# Patient Record
Sex: FEMALE | Race: WHITE | NOT HISPANIC OR LATINO | Employment: FULL TIME | URBAN - METROPOLITAN AREA
[De-identification: names, ages, dates, MRNs, and addresses within clinical notes are randomized per-mention and may not be internally consistent; named-entity substitution may affect disease eponyms.]

---

## 2017-01-31 DIAGNOSIS — E03.9 HYPOTHYROIDISM, UNSPECIFIED TYPE: ICD-10-CM

## 2017-01-31 RX ORDER — LEVOTHYROXINE SODIUM 0.1 MG/1
100 TABLET ORAL DAILY
Qty: 30 TAB | Refills: 3 | Status: SHIPPED | OUTPATIENT
Start: 2017-01-31 | End: 2017-07-05 | Stop reason: SDUPTHER

## 2017-02-28 ENCOUNTER — OFFICE VISIT (OUTPATIENT)
Dept: MEDICAL GROUP | Facility: MEDICAL CENTER | Age: 57
End: 2017-02-28
Payer: COMMERCIAL

## 2017-02-28 VITALS
DIASTOLIC BLOOD PRESSURE: 60 MMHG | SYSTOLIC BLOOD PRESSURE: 108 MMHG | HEART RATE: 60 BPM | HEIGHT: 71 IN | RESPIRATION RATE: 14 BRPM | TEMPERATURE: 98.2 F | OXYGEN SATURATION: 98 % | WEIGHT: 142.42 LBS | BODY MASS INDEX: 19.94 KG/M2

## 2017-02-28 DIAGNOSIS — L98.9 BENIGN SKIN LESION: ICD-10-CM

## 2017-02-28 DIAGNOSIS — R59.1 LYMPHADENOPATHY: ICD-10-CM

## 2017-02-28 PROCEDURE — 99214 OFFICE O/P EST MOD 30 MIN: CPT | Performed by: PHYSICIAN ASSISTANT

## 2017-02-28 RX ORDER — CLINDAMYCIN PHOSPHATE 10 MG/G
1 AEROSOL, FOAM TOPICAL 2 TIMES DAILY PRN
Qty: 50 G | Refills: 2 | Status: SHIPPED | OUTPATIENT
Start: 2017-02-28 | End: 2019-07-14 | Stop reason: SDUPTHER

## 2017-02-28 RX ORDER — CLOBETASOL PROPIONATE 0.5 MG/G
1 CREAM TOPICAL 2 TIMES DAILY
Qty: 45 G | Refills: 0 | Status: SHIPPED | OUTPATIENT
Start: 2017-02-28

## 2017-02-28 ASSESSMENT — PAIN SCALES - GENERAL: PAINLEVEL: NO PAIN

## 2017-02-28 ASSESSMENT — PATIENT HEALTH QUESTIONNAIRE - PHQ9: CLINICAL INTERPRETATION OF PHQ2 SCORE: 0

## 2017-02-28 NOTE — ASSESSMENT & PLAN NOTE
Was in [facility] on a mission trip.   Toward the end she developed congestion, ST, coughing and her cervical and axillary lymph nodes were elevated.   Since then her sx have resolved except her right neck lymph node which is still going down.   Denies fever, headache, chills, sore throat, nasal congestion, cough, shortness of breath, chest pain, abdominal discomfort, nausea, vomiting, weight loss.

## 2017-02-28 NOTE — PROGRESS NOTES
Subjective:     Chief Complaint   Patient presents with   • Medication Refill     skin breaking out     Lianne Abreu is a 56 y.o. female here today for lymph node in skin lesions as listed below    Lymphadenopathy  Was in [facility] on a mission trip.   Toward the end she developed congestion, ST, coughing and her cervical and axillary lymph nodes were elevated.   Since then her sx have resolved except her right neck lymph node which is still going down.   Denies fever, headache, chills, sore throat, nasal congestion, cough, shortness of breath, chest pain, abdominal discomfort, nausea, vomiting, weight loss.     Benign skin lesion  Has history of skin irritation symptoms and she would pick and gouge them out. Stated this is partially very sensitive skin and had some psychiatric issues at the time.  Has not done that in several years.  Although she does get skin eruptions when she is stressed.   She does see dermatologist Dr. Nugent.   Has been given clindamycin 1% foam 2 use during flares.   Although she states it does not help very much.   She does request not to scratch or touch although she will fall asleep itching/stratching or she will wake up itching/stratching.   Has noticed her warm baths which help soothe her muscles ache her skin worse.   She will put Neosporin on them.   Usually flares on her buttock region and left shoulder which are present today.   Denies fever, chills, warmth, spreading.        Current medicines (including changes today)  Current Outpatient Prescriptions   Medication Sig Dispense Refill   • Clindamycin Phosphate 1 % Foam 1 g by Apply externally route 2 times a day as needed. 50 g 2   • clobetasol (TEMOVATE) 0.05 % Cream Apply 1 g to affected area(s) 2 times a day. Apply to affected skin BID for up to 2 weeks. DO NOT use on FACE 45 g 0   • levothyroxine (SYNTHROID) 100 MCG Tab Take 1 Tab by mouth every day. In morning one hour prior to any food or drink. 30 Tab 3   • estradiol  "(MINIVELLE) 0.0375 MG/24HR patch 1 path BIW 12 Tab 3   • metaxalone (SKELAXIN) 800 MG Tab Take 1 Tab by mouth 3 times a day. 90 Tab 0   • ibuprofen (MOTRIN) 600 MG Tab Take 1 Tab by mouth every 6 hours as needed. 90 Tab 1   • Cholecalciferol (VITAMIN D) 2000 UNITS CAPS Take  by mouth every day.     • CALCIUM-MAGNESIUM-ZINC PO Take  by mouth every day.       No current facility-administered medications for this visit.     She  has a past medical history of Other specified symptom associated with female genital organs; Unspecified disorder of thyroid; Anesthesia; Unspecified hemorrhagic conditions (CMS-[facility]) (01/2015); and Raynaud's syndrome.    ROS   No chest pain, no shortness of breath, no abdominal pain       Objective:     Blood pressure 108/60, pulse 60, temperature 36.8 °C (98.2 °F), resp. rate 14, height 1.803 m (5' 10.98\"), weight 64.6 kg (142 lb 6.7 oz), SpO2 98 %, not currently breastfeeding. Body mass index is 19.87 kg/(m^2).   Physical Exam:  Alert, oriented in no acute distress.  Eye contact is good, speech goal directed, affect calm  HEENT: conjunctiva non-injected, sclera non-icteric, PERRL.  Pinna normal. TM pearly gray.   Oral mucous membranes pink and moist with no lesions. Dentition intact  Neck + right cervical lymphadenopathy, smooth firm mobile, no other cervical lymph nodes noted, no other masses in the neck, supraclavicular regions or axillary regions.  Lungs: clear to auscultation bilaterally with good excursion.   CV: regular rate and rhythm.  Abdomen: soft, nontender, no HSM, No CVAT  Skin: Arms bilaterally with several well-healed time size to quarter size circular scarring.   Left shoulder with approximately 5 mm circular abrasion, no erythema, edema, drainage, warmth.  Botox and laterally although right greater than left with multiple 4-6 mm circular abrasions, no vesicles, pustules, drainage, edema, erythema, nodules, warmth.   Ext: no edema, color normal, peripheral pulses 2+, " temperature normal      Assessment and Plan:   The following treatment plan was discussed     1. Benign skin lesion  Possibly 's nodules.   Discussed the clindamycin is a good parent to continue to help with infection control since they're open wounds.   Although recommend when the first erupt possibly use a topical steroid cream. This may help with itch and help them resolve faster.   Do ot use on face, do not use on open wound.   Only use for 2 weeks then 1 week break.   Recommend using cold compresses, gentle dove soap only in axillae, groin and feet and applying good creams after shower to help with itch  - Clindamycin Phosphate 1 % Foam; 1 g by Apply externally route 2 times a day as needed.  Dispense: 50 g; Refill: 2  - clobetasol (TEMOVATE) 0.05 % Cream; Apply 1 g to affected area(s) 2 times a day. Apply to affected skin BID for up to 2 weeks. DO NOT use on FACE  Dispense: 45 g; Refill: 0    2. Lymphadenopathy  New dx, resolving per pt.   Told to continue watching and if it persists we will need to US. Pt okay with that plan      Followup: Return if symptoms worsen or fail to improve.           Please note that this dictation was created using voice recognition software. I have made every reasonable attempt to correct obvious errors, but I expect that there are errors of grammar and possibly content that I did not discover before finalizing the note.

## 2017-02-28 NOTE — MR AVS SNAPSHOT
"Lianne Macdonaldn   2017 7:00 AM   Office Visit   MRN: [id number]    Department:  [place] [address]   Dept Phone:  [phone]    Description:  Female : 1960   Provider:  Irma Lundberg PA-C           Reason for Visit     Medication Refill skin breaking out      Allergies as of 2017     No Known Allergies      You were diagnosed with     Benign skin lesion   [850508]         Vital Signs     Blood Pressure Pulse Temperature Respirations Height Weight    108/60 mmHg 60 36.8 °C (98.2 °F) 14 1.803 m (5' 10.98\") 64.6 kg (142 lb 6.7 oz)    Body Mass Index Oxygen Saturation Breastfeeding? Smoking Status          19.87 kg/m2 98% No Never Smoker         Basic Information     Date Of Birth Sex Race Ethnicity Preferred Language    1960 Female White Non- English      Your appointments     May 19, 2017  8:20 AM   ANNUAL EXAM PREVENTATIVE with Irma Lundberg PA-C   [facility] ([place])    [address]  [address]  [place] [address] [phone]              Problem List              ICD-10-CM Priority Class Noted - Resolved    S/P hysterectomy Z90.710   2015 - Present    Hypothyroidism E03.9   3/18/2016 - Present    Preventative health care Z00.00   3/18/2016 - Present    Family history of nonmelanoma skin cancer Z80.8   3/18/2016 - Present    Acute bilateral low back pain without sciatica M54.5   2016 - Present      Health Maintenance        Date Due Completion Dates    PAP SMEAR 1982 ---    COLONOSCOPY 2011 ---    MAMMOGRAM 3/27/2016 3/27/2015, 3/20/2015, 3/20/2015, 3/20/2015, 2005    IMM INFLUENZA (1) 2016    IMM DTaP/Tdap/Td Vaccine (2 - Td) 2024            Current Immunizations     Hepatitis A Vaccine, Adult 2004, 2004    Hepatitis B Vaccine Recombivax (Adol/Adult) 2004, 2004    Influenza Vaccine Quad Inj (Pf) 2011    Meningococcal Conjugate Vaccine MCV4 (Menveo) " 1/22/2016    Tdap Vaccine 8/8/2014      Below and/or attached are the medications your provider expects you to take. Review all of your home medications and newly ordered medications with your provider and/or pharmacist. Follow medication instructions as directed by your provider and/or pharmacist. Please keep your medication list with you and share with your provider. Update the information when medications are discontinued, doses are changed, or new medications (including over-the-counter products) are added; and carry medication information at all times in the event of emergency situations     Allergies:  No Known Allergies          Medications  Valid as of: February 28, 2017 -  7:49 AM    Generic Name Brand Name Tablet Size Instructions for use    Calcium-Magnesium-Zinc   Take  by mouth every day.        Cholecalciferol (Cap) Vitamin D 2000 UNITS Take  by mouth every day.        Clindamycin Phosphate (Foam) Clindamycin Phosphate 1 % 1 g by Apply externally route 2 times a day as needed.        Clobetasol Propionate (Cream) TEMOVATE 0.05 % Apply 1 g to affected area(s) 2 times a day. Apply to affected skin BID for up to 2 weeks. DO NOT use on FACE        Estradiol (PATCH BIWEEKLY) VIVELLE 0.0375 MG/24HR 1 path BIW        Ibuprofen (Tab) MOTRIN 600 MG Take 1 Tab by mouth every 6 hours as needed.        Levothyroxine Sodium (Tab) SYNTHROID 100 MCG Take 1 Tab by mouth every day. In morning one hour prior to any food or drink.        Metaxalone (Tab) SKELAXIN 800 MG Take 1 Tab by mouth 3 times a day.        .                 Medicines prescribed today were sent to:     [facility] [place], [place] - [address]    [address] [place] [address]    Phone: [phone] Fax: [phone]    Open 24 Hours?: No      Medication refill instructions:       If your prescription bottle indicates you have medication refills left, it is not necessary to call your provider’s office. Please contact your  pharmacy and they will refill your medication.    If your prescription bottle indicates you do not have any refills left, you may request refills at any time through one of the following ways: The online [facility] system (except Urgent Care), by calling your provider’s office, or by asking your pharmacy to contact your provider’s office with a refill request. Medication refills are processed only during regular business hours and may not be available until the next business day. Your provider may request additional information or to have a follow-up visit with you prior to refilling your medication.   *Please Note: Medication refills are assigned a new Rx number when refilled electronically. Your pharmacy may indicate that no refills were authorized even though a new prescription for the same medication is available at the pharmacy. Please request the medicine by name with the pharmacy before contacting your provider for a refill.           [facility] Access Code: Activation code not generated  Current Health Integrated Status: Active

## 2017-02-28 NOTE — ASSESSMENT & PLAN NOTE
Has history of skin irritation symptoms and she would pick and gouge them out. Stated this is partially very sensitive skin and had some psychiatric issues at the time.  Has not done that in several years.  Although she does get skin eruptions when she is stressed.   She does see dermatologist Dr. Nugent.   Has been given clindamycin 1% foam 2 use during flares.   Although she states it does not help very much.   She does request not to scratch or touch although she will fall asleep itching/stratching or she will wake up itching/stratching.   Has noticed her warm baths which help soothe her muscles ache her skin worse.   She will put Neosporin on them.   Usually flares on her buttock region and left shoulder which are present today.   Denies fever, chills, warmth, spreading.

## 2017-03-15 ENCOUNTER — PATIENT MESSAGE (OUTPATIENT)
Dept: MEDICAL GROUP | Facility: MEDICAL CENTER | Age: 57
End: 2017-03-15

## 2017-03-15 DIAGNOSIS — R59.1 LYMPHADENOPATHY: ICD-10-CM

## 2017-03-20 ENCOUNTER — PATIENT MESSAGE (OUTPATIENT)
Dept: MEDICAL GROUP | Facility: MEDICAL CENTER | Age: 57
End: 2017-03-20

## 2017-03-20 ENCOUNTER — TELEPHONE (OUTPATIENT)
Dept: MEDICAL GROUP | Facility: MEDICAL CENTER | Age: 57
End: 2017-03-20

## 2017-03-20 DIAGNOSIS — R59.1 LYMPHADENOPATHY: ICD-10-CM

## 2017-03-20 NOTE — TELEPHONE ENCOUNTER
1. Caller Name: Lianne Abreu                                           Call Back Number: [phone] (home)         Patient approves a detailed voicemail message: yes    2. What are the patient's symptoms (location & severity)? Swollen Lymph node on neck    3. Is this a new symptom Yes    4. When did it start? N/A    5. Action taken per Active Symptom Guide: Pt talked to [facility] needs Provider orders for US     6. Patient agrees to recommended action per active symptom guide     Pt call in today and asked If you could put in and Stat order for a Ultrasound of swollen lymph nodes in neck area.     Has to be placed as Stat/Urgent so that [facility] will Cover Pt is in [facility] for the next 2 weeks.    [facility]   Phone: [phone]  Fax: [phone]

## 2017-03-21 ENCOUNTER — TELEPHONE (OUTPATIENT)
Dept: MEDICAL GROUP | Facility: MEDICAL CENTER | Age: 57
End: 2017-03-21

## 2017-03-21 NOTE — TELEPHONE ENCOUNTER
Pt called stating that she has been waiting for a response about her PA for the US and if she hasn't heard anything back later today. She will just cancel and re-schedule her appointment with Irma Lundberg PA-C.

## 2017-03-21 NOTE — TELEPHONE ENCOUNTER
Called Pt to return call     Had to re submitted paper work due to [facility] changing fax number

## 2017-04-11 ENCOUNTER — APPOINTMENT (OUTPATIENT)
Dept: MEDICAL GROUP | Facility: MEDICAL CENTER | Age: 57
End: 2017-04-11
Payer: COMMERCIAL

## 2017-04-18 ENCOUNTER — OFFICE VISIT (OUTPATIENT)
Dept: MEDICAL GROUP | Facility: MEDICAL CENTER | Age: 57
End: 2017-04-18
Payer: COMMERCIAL

## 2017-04-18 VITALS
RESPIRATION RATE: 14 BRPM | HEART RATE: 63 BPM | BODY MASS INDEX: 20.61 KG/M2 | WEIGHT: 143.96 LBS | SYSTOLIC BLOOD PRESSURE: 106 MMHG | HEIGHT: 70 IN | DIASTOLIC BLOOD PRESSURE: 60 MMHG | OXYGEN SATURATION: 99 % | TEMPERATURE: 98.3 F

## 2017-04-18 DIAGNOSIS — R22.1 NECK MASS: ICD-10-CM

## 2017-04-18 PROCEDURE — 99214 OFFICE O/P EST MOD 30 MIN: CPT | Performed by: PHYSICIAN ASSISTANT

## 2017-04-18 ASSESSMENT — PAIN SCALES - GENERAL: PAINLEVEL: NO PAIN

## 2017-04-18 NOTE — ASSESSMENT & PLAN NOTE
Last OV 2/28/17 discussed a new neck mass  Was in [facility] on a mission trip.    Toward the end she developed congestion, ST, coughing and her cervical and axillary lymph nodes were elevated.    Since then her sx have resolved except her right neck mass which was still going down at that time.   Since then she went to [place] and noticed it enlarged again.  She did have a mild cold. Once her cold resolved it seemed to subside although is still larger than it was back in February.  While she was in [place] we ordered ultrasound neck  3/24/17 ultrasound neck- 0.5 x 0.2 x 0.4 cm cyst in right thyroid lobe otherwise had normal nonenlarged scattered lymph nodes. No other masses noted   Has some nasal congestion, intermittent ear popping  Denies fever, headache, chills, sore throat, cough, dysphagia, acid taste in mouth, shortness of breath, chest pain, abdominal discomfort, nausea, vomiting, weight loss.

## 2017-04-18 NOTE — MR AVS SNAPSHOT
"Lianne Abreu   2017 7:00 AM   Office Visit   MRN: [id number]    Department:  [place] [address]   Dept Phone:  [phone]    Description:  Female : 1960   Provider:  Irma Lundberg PA-C           Reason for Visit     Follow-Up recheck glands in neck       Allergies as of 2017     No Known Allergies      You were diagnosed with     Neck mass   [884221]         Vital Signs     Blood Pressure Pulse Temperature Respirations Height Weight    106/60 mmHg 63 36.8 °C (98.3 °F) 14 1.778 m (5' 10\") 65.3 kg (143 lb 15.4 oz)    Body Mass Index Oxygen Saturation Breastfeeding? Smoking Status          20.66 kg/m2 99% No Never Smoker         Basic Information     Date Of Birth Sex Race Ethnicity Preferred Language    1960 Female White Non- English      Your appointments     May 11, 2017  4:30 PM   MA [id number] with S BRYANTAN MG 1   [facility] ([place]    [address]  [place] [id number] [phone]           No deodorant, powder, perfume or lotion under the arm or breast area.            May 19, 2017  8:20 AM   ANNUAL EXAM PREVENTATIVE with Irma Lundberg PA-C   [facility]    [address]  [address]  [place] [address] [phone]              Problem List              ICD-10-CM Priority Class Noted - Resolved    S/P hysterectomy Z90.710   2015 - Present    Hypothyroidism E03.9   3/18/2016 - Present    Preventative health care Z00.00   3/18/2016 - Present    Family history of nonmelanoma skin cancer Z80.8   3/18/2016 - Present    Acute bilateral low back pain without sciatica M54.5   2016 - Present    Lymphadenopathy R59.1   2017 - Present    Benign skin lesion L98.9   2017 - Present    Neck mass R22.1   2017 - Present      Health Maintenance        Date Due Completion Dates    PAP SMEAR 1982 ---    COLONOSCOPY 2011 ---    MAMMOGRAM " 3/27/2016 3/27/2015, 8/9/2005    IMM DTaP/Tdap/Td Vaccine (2 - Td) 8/8/2024 8/8/2014            Current Immunizations     Hepatitis A Vaccine, Adult 8/6/2004, 7/9/2004    Hepatitis B Vaccine Recombivax (Adol/Adult) 8/6/2004, 7/9/2004    Influenza Vaccine Quad Inj (Pf) 11/5/2011    Meningococcal Conjugate Vaccine MCV4 (Menveo) 1/22/2016    Tdap Vaccine 8/8/2014      Below and/or attached are the medications your provider expects you to take. Review all of your home medications and newly ordered medications with your provider and/or pharmacist. Follow medication instructions as directed by your provider and/or pharmacist. Please keep your medication list with you and share with your provider. Update the information when medications are discontinued, doses are changed, or new medications (including over-the-counter products) are added; and carry medication information at all times in the event of emergency situations     Allergies:  No Known Allergies          Medications  Valid as of: April 18, 2017 -  9:54 AM    Generic Name Brand Name Tablet Size Instructions for use    Calcium-Magnesium-Zinc   Take  by mouth every day.        Cholecalciferol (Cap) Vitamin D 2000 UNITS Take  by mouth every day.        Clindamycin Phosphate (Foam) Clindamycin Phosphate 1 % 1 g by Apply externally route 2 times a day as needed.        Clobetasol Propionate (Cream) TEMOVATE 0.05 % Apply 1 g to affected area(s) 2 times a day. Apply to affected skin BID for up to 2 weeks. DO NOT use on FACE        Estradiol (PATCH BIWEEKLY) VIVELLE 0.0375 MG/24HR 1 path BIW        Ibuprofen (Tab) MOTRIN 600 MG Take 1 Tab by mouth every 6 hours as needed.        Levothyroxine Sodium (Tab) SYNTHROID 100 MCG Take 1 Tab by mouth every day. In morning one hour prior to any food or drink.        Metaxalone (Tab) SKELAXIN 800 MG Take 1 Tab by mouth 3 times a day.        .                 Medicines prescribed today were sent to:     [id number] -  [place], [place] - [address]    [address] [place] [address]    Phone: [phone] Fax: [phone]    Open 24 Hours?: No      Medication refill instructions:       If your prescription bottle indicates you have medication refills left, it is not necessary to call your provider’s office. Please contact your pharmacy and they will refill your medication.    If your prescription bottle indicates you do not have any refills left, you may request refills at any time through one of the following ways: The online [employer] system (except Urgent Care), by calling your provider’s office, or by asking your pharmacy to contact your provider’s office with a refill request. Medication refills are processed only during regular business hours and may not be available until the next business day. Your provider may request additional information or to have a follow-up visit with you prior to refilling your medication.   *Please Note: Medication refills are assigned a new Rx number when refilled electronically. Your pharmacy may indicate that no refills were authorized even though a new prescription for the same medication is available at the pharmacy. Please request the medicine by name with the pharmacy before contacting your provider for a refill.        Your To Do List     Future Labs/Procedures Complete By Expires    BASIC METABOLIC PANEL  As directed 4/19/2018    MR-SOFT TISSUE NECK-WITH & W/O  As directed 4/18/2018         [employer] Access Code: Activation code not generated  Current [employer] Status: Active

## 2017-04-18 NOTE — PROGRESS NOTES
Subjective:     Chief Complaint   Patient presents with   • Follow-Up     recheck glands in neck      Lianne Abreu is a 56 y.o. female here today for neck mass as listed below    Neck mass  Last OV 2/28/17 discussed a new neck mass  Was in [facility] on a mission trip.    Toward the end she developed congestion, ST, coughing and her cervical and axillary lymph nodes were elevated.    Since then her sx have resolved except her right neck mass which was still going down at that time.   Since then she went to [place] and noticed it enlarged again.  She did have a mild cold. Once her cold resolved it seemed to subside although is still larger than it was back in February.  While she was in [place] we ordered ultrasound neck  3/24/17 ultrasound neck- 0.5 x 0.2 x 0.4 cm cyst in right thyroid lobe otherwise had normal nonenlarged scattered lymph nodes. No other masses noted   Has some nasal congestion, intermittent ear popping  Denies fever, headache, chills, sore throat, cough, dysphagia, acid taste in mouth, shortness of breath, chest pain, abdominal discomfort, nausea, vomiting, weight loss.          Current medicines (including changes today)  Current Outpatient Prescriptions   Medication Sig Dispense Refill   • Clindamycin Phosphate 1 % Foam 1 g by Apply externally route 2 times a day as needed. 50 g 2   • clobetasol (TEMOVATE) 0.05 % Cream Apply 1 g to affected area(s) 2 times a day. Apply to affected skin BID for up to 2 weeks. DO NOT use on FACE 45 g 0   • levothyroxine (SYNTHROID) 100 MCG Tab Take 1 Tab by mouth every day. In morning one hour prior to any food or drink. 30 Tab 3   • metaxalone (SKELAXIN) 800 MG Tab Take 1 Tab by mouth 3 times a day. 90 Tab 0   • ibuprofen (MOTRIN) 600 MG Tab Take 1 Tab by mouth every 6 hours as needed. 90 Tab 1   • estradiol (MINIVELLE) 0.0375 MG/24HR patch 1 path BIW 12 Tab 3   • Cholecalciferol (VITAMIN D) 2000 UNITS CAPS Take  by mouth every day.     • CALCIUM-MAGNESIUM-ZINC PO  "Take  by mouth every day.       No current facility-administered medications for this visit.     She  has a past medical history of Other specified symptom associated with female genital organs; Unspecified disorder of thyroid; Anesthesia; Unspecified hemorrhagic conditions (CMS-HCC) (01/2015); and Raynaud's syndrome.    ROS   See history of present illness above    Objective:     Blood pressure 106/60, pulse 63, temperature 36.8 °C (98.3 °F), resp. rate 14, height 1.778 m (5' 10\"), weight 65.3 kg (143 lb 15.4 oz), SpO2 99 %, not currently breastfeeding. Body mass index is 20.66 kg/(m^2).   Physical Exam:  Alert, oriented in no acute distress.  Eye contact is good, speech goal directed, affect calm  HEENT: conjunctiva non-injected, sclera non-icteric, PERRL.  Pinna normal. TM pearly gray.   Nares patent, nasal turbinates pink and moist  Oral mucous membranes pink and moist with no lesions. Mild to moderate postnasal drip which is clear  Neck positive right mid neck mass noted, approximately 1.5 cm firm, nonmobile, approximately 4 cm superior from thyroid, no erythema, edema, ecchymosis  Lungs: clear to auscultation bilaterally with good excursion.  CV: regular rate and rhythm.  Ext: no edema, color normal, peripheral pulses 2+, temperature normal        Assessment and Plan:   The following treatment plan was discussed     1. Neck mass  Persistent, seems like a neck mass is quite a ways from her thyroid, there was no mention of this on ultrasound.  Patient does have disc although she has at home she does not have it with her.  We will try to get that skidded into her chart so we can see the imaging.  Since mass still present plus non-mobile and very firm will get MRI.   - MR-SOFT TISSUE NECK-WITH & W/O; Future  - BASIC METABOLIC PANEL; Future      Followup: Return if symptoms worsen or fail to improve.           Please note that this dictation was created using voice recognition software. I have made every reasonable " attempt to correct obvious errors, but I expect that there are errors of grammar and possibly content that I did not discover before finalizing the note.

## 2017-05-03 ENCOUNTER — HOSPITAL ENCOUNTER (OUTPATIENT)
Dept: RADIOLOGY | Facility: MEDICAL CENTER | Age: 57
End: 2017-05-03
Attending: PHYSICIAN ASSISTANT
Payer: COMMERCIAL

## 2017-05-03 DIAGNOSIS — R22.1 NECK MASS: ICD-10-CM

## 2017-05-03 PROCEDURE — 70543 MRI ORBT/FAC/NCK W/O &W/DYE: CPT

## 2017-05-03 PROCEDURE — 700117 HCHG RX CONTRAST REV CODE 255: Performed by: PHYSICIAN ASSISTANT

## 2017-05-03 PROCEDURE — A9579 GAD-BASE MR CONTRAST NOS,1ML: HCPCS | Performed by: PHYSICIAN ASSISTANT

## 2017-05-03 RX ADMIN — GADODIAMIDE 15 ML: 287 INJECTION INTRAVENOUS at 14:21

## 2017-05-08 DIAGNOSIS — I77.9 CAROTID ARTERY DISEASE, UNSPECIFIED LATERALITY (HCC): ICD-10-CM

## 2017-05-10 DIAGNOSIS — R22.1 NECK MASS: ICD-10-CM

## 2017-05-10 DIAGNOSIS — R59.1 LYMPHADENOPATHY: ICD-10-CM

## 2017-05-10 DIAGNOSIS — R13.10 DYSPHAGIA, UNSPECIFIED TYPE: ICD-10-CM

## 2017-05-10 DIAGNOSIS — R93.89 ABNORMAL FINDING ON IMAGING: ICD-10-CM

## 2017-05-11 ENCOUNTER — HOSPITAL ENCOUNTER (OUTPATIENT)
Dept: RADIOLOGY | Facility: MEDICAL CENTER | Age: 57
End: 2017-05-11
Attending: FAMILY MEDICINE
Payer: COMMERCIAL

## 2017-05-11 PROCEDURE — 77063 BREAST TOMOSYNTHESIS BI: CPT

## 2017-05-15 ENCOUNTER — HOSPITAL ENCOUNTER (OUTPATIENT)
Dept: RADIOLOGY | Facility: MEDICAL CENTER | Age: 57
End: 2017-05-15
Attending: PHYSICIAN ASSISTANT
Payer: COMMERCIAL

## 2017-05-15 DIAGNOSIS — R93.89 ABNORMAL FINDING ON IMAGING: ICD-10-CM

## 2017-05-15 PROCEDURE — 93880 EXTRACRANIAL BILAT STUDY: CPT

## 2017-05-18 ENCOUNTER — TELEPHONE (OUTPATIENT)
Dept: MEDICAL GROUP | Facility: MEDICAL CENTER | Age: 57
End: 2017-05-18

## 2017-05-18 NOTE — TELEPHONE ENCOUNTER
ESTABLISHED PATIENT PRE-VISIT PLANNING     Note: Patient will not be contacted if there is no indication to call.     1.  Reviewed note from last office visit with PCP and/or other med group provider: Yes  04/18/2017  2.  If any orders were placed at last visit, do we have Results/Consult Notes?        •  Labs - Patient given labs last ov, Irma did not specify if she wanted patient to have labs done by next appt.        •  Imaging - Imaging ordered, completed and results are in chart.       •  Referrals -ENT referral pending     3.  Immunizations were updated in [facility] using WebIZ?: Yes       •  Web Iz Recommendations: HEPATITIS A  HEPATITIS B MMR  TD VARICELLA (Chicken Pox)     4.  Patient is due for the following Health Maintenance Topics:   Health Maintenance Due   Topic Date Due   • PAP SMEAR  07/18/1982   • COLONOSCOPY  Colonoscopy on file will fix in chart. 07/18/2011           5.  Patient was informed to arrive 15 min prior to their scheduled appointment and bring in their medication bottles.

## 2017-05-18 NOTE — TELEPHONE ENCOUNTER
Replied to patient's [employer] message and then called to let pt know I sent message and to maybe walk her through a couple things in the message.   I also stated today is my day off and I cannot guarantee I will be able to respond back today.   Irma

## 2017-05-19 ENCOUNTER — TELEPHONE (OUTPATIENT)
Dept: MEDICAL GROUP | Facility: MEDICAL CENTER | Age: 57
End: 2017-05-19

## 2017-05-19 ENCOUNTER — APPOINTMENT (OUTPATIENT)
Dept: MEDICAL GROUP | Facility: MEDICAL CENTER | Age: 57
End: 2017-05-19
Payer: COMMERCIAL

## 2017-05-19 ENCOUNTER — HOSPITAL ENCOUNTER (OUTPATIENT)
Dept: RADIOLOGY | Facility: MEDICAL CENTER | Age: 57
End: 2017-05-19
Attending: PHYSICIAN ASSISTANT
Payer: COMMERCIAL

## 2017-05-19 ENCOUNTER — PATIENT MESSAGE (OUTPATIENT)
Dept: MEDICAL GROUP | Facility: MEDICAL CENTER | Age: 57
End: 2017-05-19

## 2017-05-19 DIAGNOSIS — I65.22 INTERNAL CAROTID ARTERY THROMBOSIS, LEFT: ICD-10-CM

## 2017-05-19 DIAGNOSIS — R22.1 NECK MASS: ICD-10-CM

## 2017-05-19 PROCEDURE — 700117 HCHG RX CONTRAST REV CODE 255: Performed by: PHYSICIAN ASSISTANT

## 2017-05-19 PROCEDURE — 70498 CT ANGIOGRAPHY NECK: CPT

## 2017-05-19 PROCEDURE — 70496 CT ANGIOGRAPHY HEAD: CPT

## 2017-05-19 RX ADMIN — IOHEXOL 100 ML: 350 INJECTION, SOLUTION INTRAVENOUS at 14:19

## 2017-05-19 NOTE — TELEPHONE ENCOUNTER
Called and scheduled imaging appt to at [facility] at 2 pm as stat order and informed Pt to be there at 130pm.    Called [facility], spoke with hector and informed her I was sending a stat referral to them with dilcia, Ins, Imaging Reports along with last two office visits. Informed them that Pt is having stat Imaging CT-CTA Neck and Head, done today 5/19/2017at 2pm at [facility] and would be sending them the results of these tests as well. Informed pt that [facility] would be reaching out to her to schedule appt .    Informed Irma ZHANG

## 2017-05-19 NOTE — TELEPHONE ENCOUNTER
Pt missed appt today.   Called patient and since having the imaging done she has noticed more symptoms.   She started having dizziness, memory issues, and the dark spot in her eye came back.   Those symptoms are now gone and she relized it only happens when there is pressure on her neck.     Although I talked with cardiology as per my previous note I told pt this is a clogged internal carotid artery and can be causing TIAs and possibly stroke.   If she gets any symptoms- she needs to go to ER.   In the meantime I will send urgent referral to vascular surgery and a stat CTA neck and brain.   Eulalio is following up on both these referrals.   Irma

## 2017-05-19 NOTE — TELEPHONE ENCOUNTER
Pt has Appt, with [facility] on 5/23/2017 @ 820 am Pt told to be there and 8 am.     Pt is with Dr. Tammy Muro

## 2017-05-19 NOTE — TELEPHONE ENCOUNTER
Talked to radiologist regarding CTA of neck.   Stated the Left carotid artery is extremely small, possibly old thrombosis or congenital.   He also noted other changes and new bypasses developed but no reason for acute changes.   Suggested possible MRA or discussing with interventional radiologist.   I mentioned having the appt with vascular [facility].   He stated that would be great for them to also review this since she is having such stroke like acute symptoms.     At this point she has an appt next week with vascular [facility], we will send over all the images.   We will wait to see if they suggest more images such as the MRA.   Pt knows ER precautions.   Irma

## 2017-05-25 ENCOUNTER — TELEPHONE (OUTPATIENT)
Dept: MEDICAL GROUP | Facility: MEDICAL CENTER | Age: 57
End: 2017-05-25

## 2017-05-25 DIAGNOSIS — Z13.1 SCREENING FOR DIABETES MELLITUS: ICD-10-CM

## 2017-05-25 DIAGNOSIS — Z13.6 SCREENING FOR CARDIOVASCULAR CONDITION: ICD-10-CM

## 2017-05-25 DIAGNOSIS — E55.9 VITAMIN D DEFICIENCY DISEASE: ICD-10-CM

## 2017-05-25 DIAGNOSIS — E03.9 HYPOTHYROIDISM, UNSPECIFIED TYPE: ICD-10-CM

## 2017-05-25 DIAGNOSIS — D72.819 LEUKOPENIA, UNSPECIFIED TYPE: ICD-10-CM

## 2017-05-25 NOTE — TELEPHONE ENCOUNTER
----- Message from Christiana Edwards, Med Ass't sent at 5/25/2017  6:57 AM PDT -----  Regarding: FW: Non-Urgent Medical Question  Contact: [phone]      ----- Message -----     From: Lianne Abreu     Sent: 5/24/2017   5:23 PM       To: Ashley Zarate  Subject: Non-Urgent Medical Question                      Miranda Solis,  I am scheduled for my annual well-check next Friday June 2nd, normally we do a panel of blood work, prior to the appt so we can change things as needed. Appreciate that.  Lianne Abreu

## 2017-05-27 ENCOUNTER — HOSPITAL ENCOUNTER (OUTPATIENT)
Dept: LAB | Facility: MEDICAL CENTER | Age: 57
End: 2017-05-27
Attending: PHYSICIAN ASSISTANT
Payer: COMMERCIAL

## 2017-05-27 DIAGNOSIS — E55.9 VITAMIN D DEFICIENCY DISEASE: ICD-10-CM

## 2017-05-27 DIAGNOSIS — Z13.6 SCREENING FOR CARDIOVASCULAR CONDITION: ICD-10-CM

## 2017-05-27 DIAGNOSIS — D72.819 LEUKOPENIA, UNSPECIFIED TYPE: ICD-10-CM

## 2017-05-27 DIAGNOSIS — Z13.1 SCREENING FOR DIABETES MELLITUS: ICD-10-CM

## 2017-05-27 DIAGNOSIS — E03.9 HYPOTHYROIDISM, UNSPECIFIED TYPE: ICD-10-CM

## 2017-05-27 LAB
25(OH)D3 SERPL-MCNC: 27 NG/ML (ref 30–100)
ALBUMIN SERPL BCP-MCNC: 4.7 G/DL (ref 3.2–4.9)
ALBUMIN/GLOB SERPL: 1.9 G/DL
ALP SERPL-CCNC: 59 U/L (ref 30–99)
ALT SERPL-CCNC: 17 U/L (ref 2–50)
ANION GAP SERPL CALC-SCNC: 7 MMOL/L (ref 0–11.9)
AST SERPL-CCNC: 20 U/L (ref 12–45)
BASOPHILS # BLD AUTO: 1.6 % (ref 0–1.8)
BASOPHILS # BLD: 0.08 K/UL (ref 0–0.12)
BILIRUB SERPL-MCNC: 1.1 MG/DL (ref 0.1–1.5)
BUN SERPL-MCNC: 18 MG/DL (ref 8–22)
CALCIUM SERPL-MCNC: 10 MG/DL (ref 8.5–10.5)
CHLORIDE SERPL-SCNC: 104 MMOL/L (ref 96–112)
CHOLEST SERPL-MCNC: 117 MG/DL (ref 100–199)
CO2 SERPL-SCNC: 28 MMOL/L (ref 20–33)
CREAT SERPL-MCNC: 0.88 MG/DL (ref 0.5–1.4)
EOSINOPHIL # BLD AUTO: 0.08 K/UL (ref 0–0.51)
EOSINOPHIL NFR BLD: 1.6 % (ref 0–6.9)
ERYTHROCYTE [DISTWIDTH] IN BLOOD BY AUTOMATED COUNT: 41.3 FL (ref 35.9–50)
GFR SERPL CREATININE-BSD FRML MDRD: >60 ML/MIN/1.73 M 2
GLOBULIN SER CALC-MCNC: 2.5 G/DL (ref 1.9–3.5)
GLUCOSE SERPL-MCNC: 79 MG/DL (ref 65–99)
HCT VFR BLD AUTO: 42.7 % (ref 37–47)
HDLC SERPL-MCNC: 62 MG/DL
HGB BLD-MCNC: 14.7 G/DL (ref 12–16)
IMM GRANULOCYTES # BLD AUTO: 0.01 K/UL (ref 0–0.11)
IMM GRANULOCYTES NFR BLD AUTO: 0.2 % (ref 0–0.9)
LDLC SERPL CALC-MCNC: 45 MG/DL
LYMPHOCYTES # BLD AUTO: 1.15 K/UL (ref 1–4.8)
LYMPHOCYTES NFR BLD: 23.3 % (ref 22–41)
MCH RBC QN AUTO: 32.4 PG (ref 27–33)
MCHC RBC AUTO-ENTMCNC: 34.4 G/DL (ref 33.6–35)
MCV RBC AUTO: 94.1 FL (ref 81.4–97.8)
MONOCYTES # BLD AUTO: 0.51 K/UL (ref 0–0.85)
MONOCYTES NFR BLD AUTO: 10.3 % (ref 0–13.4)
NEUTROPHILS # BLD AUTO: 3.11 K/UL (ref 2–7.15)
NEUTROPHILS NFR BLD: 63 % (ref 44–72)
NRBC # BLD AUTO: 0 K/UL
NRBC BLD AUTO-RTO: 0 /100 WBC
PLATELET # BLD AUTO: 158 K/UL (ref 164–446)
PMV BLD AUTO: 11.7 FL (ref 9–12.9)
POTASSIUM SERPL-SCNC: 4.2 MMOL/L (ref 3.6–5.5)
PROT SERPL-MCNC: 7.2 G/DL (ref 6–8.2)
RBC # BLD AUTO: 4.54 M/UL (ref 4.2–5.4)
SODIUM SERPL-SCNC: 139 MMOL/L (ref 135–145)
T4 FREE SERPL-MCNC: 1.33 NG/DL (ref 0.53–1.43)
TRIGL SERPL-MCNC: 49 MG/DL (ref 0–149)
TSH SERPL DL<=0.005 MIU/L-ACNC: 0.46 UIU/ML (ref 0.3–3.7)
WBC # BLD AUTO: 4.9 K/UL (ref 4.8–10.8)

## 2017-05-27 PROCEDURE — 36415 COLL VENOUS BLD VENIPUNCTURE: CPT

## 2017-05-27 PROCEDURE — 85025 COMPLETE CBC W/AUTO DIFF WBC: CPT

## 2017-05-27 PROCEDURE — 80061 LIPID PANEL: CPT

## 2017-05-27 PROCEDURE — 80053 COMPREHEN METABOLIC PANEL: CPT

## 2017-05-27 PROCEDURE — 82306 VITAMIN D 25 HYDROXY: CPT

## 2017-05-27 PROCEDURE — 84443 ASSAY THYROID STIM HORMONE: CPT

## 2017-05-27 PROCEDURE — 84439 ASSAY OF FREE THYROXINE: CPT

## 2017-06-01 ENCOUNTER — TELEPHONE (OUTPATIENT)
Dept: MEDICAL GROUP | Facility: MEDICAL CENTER | Age: 57
End: 2017-06-01

## 2017-06-01 NOTE — TELEPHONE ENCOUNTER
ESTABLISHED PATIENT PRE-VISIT PLANNING     Note: Patient will not be contacted if there is no indication to call.     1.  Reviewed note from last office visit with PCP and/or other med group provider: Yes  04/18/2017  2.  If any orders were placed at last visit, do we have Results/Consult Notes?        •  Labs - Labs ordered, completed and results are in chart.       •  Imaging - Imaging ordered, completed and results are in chart.       •  Referrals Referral to vascular pending     3.  Immunizations were updated in [facility] using WebIZ?: Yes       •  Web Iz Recommendations: FLU HEPATITIS A  HEPATITIS B MMR  VARICELLA (Chicken Pox)     4.  Patient is due for the following Health Maintenance Topics:   Health Maintenance Due   Topic Date Due   • PAP SMEAR  07/18/1982           5.  Patient was not informed to arrive 15 min prior to their scheduled appointment and bring in their medication bottles.

## 2017-06-02 ENCOUNTER — OFFICE VISIT (OUTPATIENT)
Dept: MEDICAL GROUP | Facility: MEDICAL CENTER | Age: 57
End: 2017-06-02
Payer: COMMERCIAL

## 2017-06-02 VITALS
WEIGHT: 141.09 LBS | DIASTOLIC BLOOD PRESSURE: 70 MMHG | HEART RATE: 74 BPM | RESPIRATION RATE: 16 BRPM | TEMPERATURE: 98.9 F | BODY MASS INDEX: 20.2 KG/M2 | HEIGHT: 70 IN | SYSTOLIC BLOOD PRESSURE: 118 MMHG | OXYGEN SATURATION: 96 %

## 2017-06-02 DIAGNOSIS — R22.1 NECK MASS: ICD-10-CM

## 2017-06-02 DIAGNOSIS — I44.0 AV BLOCK, 1ST DEGREE: ICD-10-CM

## 2017-06-02 DIAGNOSIS — Z00.00 PREVENTATIVE HEALTH CARE: Primary | ICD-10-CM

## 2017-06-02 DIAGNOSIS — E03.9 HYPOTHYROIDISM, UNSPECIFIED TYPE: ICD-10-CM

## 2017-06-02 DIAGNOSIS — Z90.710 S/P HYSTERECTOMY: ICD-10-CM

## 2017-06-02 DIAGNOSIS — R01.1 MURMUR: ICD-10-CM

## 2017-06-02 DIAGNOSIS — R20.2 TINGLING: ICD-10-CM

## 2017-06-02 DIAGNOSIS — Z80.8 FAMILY HISTORY OF NONMELANOMA SKIN CANCER: ICD-10-CM

## 2017-06-02 PROBLEM — R59.1 LYMPHADENOPATHY: Status: RESOLVED | Noted: 2017-02-28 | Resolved: 2017-06-02

## 2017-06-02 PROBLEM — L98.9 BENIGN SKIN LESION: Status: RESOLVED | Noted: 2017-02-28 | Resolved: 2017-06-02

## 2017-06-02 PROCEDURE — 99396 PREV VISIT EST AGE 40-64: CPT | Mod: 25 | Performed by: PHYSICIAN ASSISTANT

## 2017-06-02 PROCEDURE — 99214 OFFICE O/P EST MOD 30 MIN: CPT | Mod: 25 | Performed by: PHYSICIAN ASSISTANT

## 2017-06-02 ASSESSMENT — PAIN SCALES - GENERAL: PAINLEVEL: NO PAIN

## 2017-06-02 NOTE — ASSESSMENT & PLAN NOTE
Sister and father- non melanoma skin cancer  Sees Dr. Nugent annually.   Also use to pick at skin and has scaring on arms bilaterally.

## 2017-06-02 NOTE — PROGRESS NOTES
Subjective:     Chief Complaint   Patient presents with   • Annual Exam     Lianne Abreu is a 56 y.o. female here today for annual as listed below    Colonoscopy- 2015, normal good for 10 year.   mammo- 5/11/17, normal.   Pap- 2015 (requesting records)   TDap- 8/8/14  Flu- 11/5/11    Tingling  Started 10/2016- one large Headache.   Then noticed mass in left neck 1/2017.   Then had 2 episodes of black out in left eye- ophth stated vascular issue.  when exercising will get tingling in left jaw and pain in neck.   When carotids are pushed (ultrasounds) will feel light headed and fatigued.   3/24/17 ultrasound neck- 0.5 x 0.2 x 0.4 cm cyst in right thyroid lobe otherwise had normal nonenlarged scattered lymph nodes. No other masses noted  5/3/17- MRI neck   1.  MRI of the neck soft tissues without and with contrast within normal limits. As with any palpable abnormality, follow-up on clinical grounds is advised.  2.  Incidentally noted slow flow or occlusion of the left distal internal carotid artery. This finding could be further evaluated with dedicated carotid duplex ultrasound.  3.  Tiny T2 hyperintense cyst in the right thyroid lobe.  5/19/17- CT neck  1.  Diminutive left common carotid artery supplying the left external carotid artery. No left carotid artery bifurcation and no left internal carotid artery identified. Uncertain whether developmental or the result of old thrombotic or dissection event.  2.  Right carotid artery system well-opacified and patent.  3.  The vertebral arteries are well-opacified and patent.  4.  Tiny nodules within the thyroid gland.  5/19/17- CT head  1.  The posterior circulation is patent.  2.  The left internal carotid artery is occluded.  3.  The left M1 segment and sylvian artery branches of the left middle cerebral artery are opacified via the posterior communicating artery.  4.  The left A1 segment is not opacified consistent with occlusion.  5.  A dominant right anterior  cerebral artery and diminutive left anterior cerebral artery are demonstrated.  6.  No intracranial mass effect or acute hemorrhage.    This did seem to be chronic vascular changes but since she was having sx I consulted with cardiology and we sent her to vascular [facility].   There was told she is very healthy and no treatment needed.   Has been told in past she had murmur.   Occasionally has awaken with faster heart beat- 2 times in last 1 week.   Very intermittent, does resolve on own.   If sits up will occasionally need to lay back down 2/2 tingling or fatigue feeling but no lightheadedness or dizziness or presyncope.   Denies CP, SOB, dizziness, palpations, syncope, N/V, leg swelling.     Family history of nonmelanoma skin cancer  Sister and father- non melanoma skin cancer  Sees Dr. Nugent annually.   Also use to pick at skin and has scaring on arms bilaterally.     Hypothyroidism  Results for TANI ELE (MRN [id number]) as of 6/2/2017 16:50   Ref. Range 6/3/2016 08:25 5/27/2017 11:12   TSH Latest Ref Range: 0.300-3.700 uIU/mL 1.410 0.460   Free T-4 Latest Ref Range: 0.53-1.43 ng/dL 1.22 1.33   T3 Latest Ref Range: 60.0-181.0 ng/dL 87.5    Levothyroxine 100mcg qd.  History with 4th child had thyrotoxicosis.   Also believes with second child had mild case of thyroid toxicosis.  Denies diarrhea, constipation, intolerance to hot/cold, rash, palpitation    S/P hysterectomy  Total hysterectomy including ovaries- 2/2 benign fibroid and consists.   3/10/16- PAP, Dr. Rodgers.   Denies any vaginal bleeding  Treating with estradiol patch.   Denies CP, abdominal pain, N/V, dysuria, freq, urgency, hematuria, change in vaginal discharge, lesions or sores         Current medicines (including changes today)  Current Outpatient Prescriptions   Medication Sig Dispense Refill   • Clindamycin Phosphate 1 % Foam 1 g by Apply externally route 2 times a day as needed. 50 g 2   • clobetasol (TEMOVATE) 0.05 % Cream Apply 1 g to affected  "area(s) 2 times a day. Apply to affected skin BID for up to 2 weeks. DO NOT use on FACE 45 g 0   • levothyroxine (SYNTHROID) 100 MCG Tab Take 1 Tab by mouth every day. In morning one hour prior to any food or drink. 30 Tab 3   • metaxalone (SKELAXIN) 800 MG Tab Take 1 Tab by mouth 3 times a day. 90 Tab 0   • ibuprofen (MOTRIN) 600 MG Tab Take 1 Tab by mouth every 6 hours as needed. 90 Tab 1   • estradiol (MINIVELLE) 0.0375 MG/24HR patch 1 path BIW 12 Tab 3   • Cholecalciferol (VITAMIN D) 2000 UNITS CAPS Take  by mouth every day.     • CALCIUM-MAGNESIUM-ZINC PO Take  by mouth every day.       No current facility-administered medications for this visit.     She  has a past medical history of Other specified symptom associated with female genital organs; Unspecified disorder of thyroid; Anesthesia; Unspecified hemorrhagic conditions (01/2015); and Raynaud's syndrome.    ROS   No chest pain, no shortness of breath, no abdominal pain     Objective:     Blood pressure 118/70, pulse 74, temperature 37.2 °C (98.9 °F), resp. rate 16, height 1.778 m (5' 10\"), weight 64 kg (141 lb 1.5 oz), SpO2 96 %, not currently breastfeeding. Body mass index is 20.24 kg/(m^2).   Physical Exam:  Alert, oriented in no acute distress.  Eye contact is good, speech goal directed, affect calm  HEENT: conjunctiva non-injected, sclera non-icteric, PERRL.  Pinna normal. TM pearly gray.   Oral mucous membranes pink and moist with no lesions.  Neck No adenopathy or masses in the neck or supraclavicular regions.  Lungs: clear to auscultation bilaterally with good excursion.  CV: regular rate and rhythm. No murmur hear but possibly extra heart beat very slight and heard in LSR  Abdomen: soft, nontender, no HSM, No CVAT  Ext: no edema, color normal, peripheral pulses 2+, temperature normal    EKG- sinus rhythm, prolonged MD interval in all leads. No dropped beats. Inverted T waves in V1, V2. Slight bradycardia.     Assessment and Plan:   The following " treatment plan was discussed     1. Preventative health care  Colonoscopy- 2015, normal good for 10 year.   mammo- 5/11/17, normal.   Pap- 2015 (requesting records)   TDap- 8/8/14  Flu- 11/5/11    2. S/P hysterectomy  Controlled, treating with HRT and followed by OBGYN    3. Hypothyroidism, unspecified type  Controlled, continue current regimen    4. Family history of nonmelanoma skin cancer  No new lesions, continue seeing dermatologist yearly    5. Neck mass  Persistent, does have abnormal collateral vasculature seems to be chronic    6. Tingling  Normal recent labs.   EKG- sinus rhythm, prolonged NM interval in all leads. No dropped beats. Inverted T waves in V1, V2. Slight bradycardia.   Also wakes up with tachycardia intermittently per patient, would like to do Holter monitor and echocardiogram. We'll be sending referral to cardiology  ER precautions discussed in depth, patient stated it takes a lot for her to go to the ER she will most likely not go unless she is dragged there.    7. AV block, 1st degree  Same as #6    Followup: Return in about 4 weeks (around 6/30/2017) for sooner if needed. .           Please note that this dictation was created using voice recognition software. I have made every reasonable attempt to correct obvious errors, but I expect that there are errors of grammar and possibly content that I did not discover before finalizing the note.

## 2017-06-02 NOTE — ASSESSMENT & PLAN NOTE
Results for TANI LEE (MRN [id number]) as of 6/2/2017 16:50   Ref. Range 6/3/2016 08:25 5/27/2017 11:12   TSH Latest Ref Range: 0.300-3.700 uIU/mL 1.410 0.460   Free T-4 Latest Ref Range: 0.53-1.43 ng/dL 1.22 1.33   T3 Latest Ref Range: 60.0-181.0 ng/dL 87.5    Levothyroxine 100mcg qd.  History with 4th child had thyrotoxicosis.   Also believes with second child had mild case of thyroid toxicosis.  Denies diarrhea, constipation, intolerance to hot/cold, rash, palpitation

## 2017-06-02 NOTE — ASSESSMENT & PLAN NOTE
Started 10/2016- one large Headache.   Then noticed mass in left neck 1/2017.   Then had 2 episodes of black out in left eye- ophth stated vascular issue.  when exercising will get tingling in left jaw and pain in neck.   When carotids are pushed (ultrasounds) will feel light headed and fatigued.   3/24/17 ultrasound neck- 0.5 x 0.2 x 0.4 cm cyst in right thyroid lobe otherwise had normal nonenlarged scattered lymph nodes. No other masses noted  5/3/17- MRI neck   1.  MRI of the neck soft tissues without and with contrast within normal limits. As with any palpable abnormality, follow-up on clinical grounds is advised.  2.  Incidentally noted slow flow or occlusion of the left distal internal carotid artery. This finding could be further evaluated with dedicated carotid duplex ultrasound.  3.  Tiny T2 hyperintense cyst in the right thyroid lobe.  5/19/17- CT neck  1.  Diminutive left common carotid artery supplying the left external carotid artery. No left carotid artery bifurcation and no left internal carotid artery identified. Uncertain whether developmental or the result of old thrombotic or dissection event.  2.  Right carotid artery system well-opacified and patent.  3.  The vertebral arteries are well-opacified and patent.  4.  Tiny nodules within the thyroid gland.  5/19/17- CT head  1.  The posterior circulation is patent.  2.  The left internal carotid artery is occluded.  3.  The left M1 segment and sylvian artery branches of the left middle cerebral artery are opacified via the posterior communicating artery.  4.  The left A1 segment is not opacified consistent with occlusion.  5.  A dominant right anterior cerebral artery and diminutive left anterior cerebral artery are demonstrated.  6.  No intracranial mass effect or acute hemorrhage.    This did seem to be chronic vascular changes but since she was having sx I consulted with cardiology and we sent her to [facility].   There was told she is very  healthy and no treatment needed.   Has been told in past she had murmur.   Occasionally has awaken with faster heart beat- 2 times in last 1 week.   Very intermittent, does resolve on own.   If sits up will occasionally need to lay back down 2/2 tingling or fatigue feeling but no lightheadedness or dizziness or presyncope.   Denies CP, SOB, dizziness, palpations, syncope, N/V, leg swelling.

## 2017-06-02 NOTE — ASSESSMENT & PLAN NOTE
Total hysterectomy including ovaries- 2/2 benign fibroid and consists.   3/10/16- PAP, Dr. Rodgers.   Denies any vaginal bleeding  Treating with estradiol patch.   Denies CP, abdominal pain, N/V, dysuria, freq, urgency, hematuria, change in vaginal discharge, lesions or sores

## 2017-06-07 DIAGNOSIS — R00.2 PALPITATIONS: ICD-10-CM

## 2017-06-07 DIAGNOSIS — R00.1 BRADYCARDIA: ICD-10-CM

## 2017-06-07 DIAGNOSIS — R94.31 ABNORMAL EKG: ICD-10-CM

## 2017-06-12 ENCOUNTER — OFFICE VISIT (OUTPATIENT)
Dept: CARDIOLOGY | Facility: MEDICAL CENTER | Age: 57
End: 2017-06-12
Payer: COMMERCIAL

## 2017-06-12 VITALS
WEIGHT: 143 LBS | OXYGEN SATURATION: 96 % | HEART RATE: 68 BPM | BODY MASS INDEX: 20.47 KG/M2 | DIASTOLIC BLOOD PRESSURE: 70 MMHG | SYSTOLIC BLOOD PRESSURE: 116 MMHG | HEIGHT: 70 IN

## 2017-06-12 DIAGNOSIS — I65.22 CAROTID OCCLUSION, LEFT: ICD-10-CM

## 2017-06-12 DIAGNOSIS — M54.2 NECK PAIN: ICD-10-CM

## 2017-06-12 DIAGNOSIS — R00.2 PALPITATIONS: ICD-10-CM

## 2017-06-12 PROBLEM — R22.1 NECK MASS: Status: RESOLVED | Noted: 2017-04-18 | Resolved: 2017-06-12

## 2017-06-12 LAB — EKG IMPRESSION: NORMAL

## 2017-06-12 PROCEDURE — 93000 ELECTROCARDIOGRAM COMPLETE: CPT | Performed by: INTERNAL MEDICINE

## 2017-06-12 PROCEDURE — 99244 OFF/OP CNSLTJ NEW/EST MOD 40: CPT | Performed by: INTERNAL MEDICINE

## 2017-06-12 ASSESSMENT — ENCOUNTER SYMPTOMS
PALPITATIONS: 0
LOSS OF CONSCIOUSNESS: 0
FEVER: 0
DIZZINESS: 0
CHILLS: 0
BRUISES/BLEEDS EASILY: 0
SHORTNESS OF BREATH: 0
PND: 0
MYALGIAS: 0
ABDOMINAL PAIN: 0
HEADACHES: 0
ORTHOPNEA: 0
NAUSEA: 0
COUGH: 0

## 2017-06-12 NOTE — PROGRESS NOTES
"Subjective:   Lianne Abreu is a 56 y.o. female who presents today in consult on on of Irma Toño secondary to prolonged and intermittent jaw pain and neck pain. Workup has shown occluded left internal carotid. Has seen vascular surgery. No known vasculitis in past. No syncope. Good exercise tolerance but intermittent jaw ache and neck ache. ECG with possible old septal MI. No syncope. Works as a  at a [employer]. Positive FH of CAD. No known CVA in past. Nl lipids.     Past Medical History   Diagnosis Date   • Other specified symptom associated with female genital organs    • Unspecified disorder of thyroid    • Anesthesia      sister had \"seizures after surgery\", due to low sodium?   • Unspecified hemorrhagic conditions 01/2015     vaginal bleeding   • Raynaud's syndrome      Past Surgical History   Procedure Laterality Date   • Vaginal hysterectomy scope total  1/30/2015     Performed by Db Rodgers M.D. at SURGERY SAME DAY [facility] ORS   • Tonsillectomy     • Primary c section     • Tubal coagulation laparoscopic bilateral       Family History   Problem Relation Age of Onset   • Cancer Father      colon cancer   • Thyroid Father    • Heart Disease Father 40     MI   • Cancer Sister      ovarian ca   • Thyroid Sister    • Stroke Neg Hx    • Diabetes Neg Hx    • Cancer Sister      SCCA   • Thyroid Sister      History   Smoking status   • Never Smoker    Smokeless tobacco   • Never Used     No Known Allergies  Outpatient Encounter Prescriptions as of 6/12/2017   Medication Sig Dispense Refill   • Clindamycin Phosphate 1 % Foam 1 g by Apply externally route 2 times a day as needed. 50 g 2   • clobetasol (TEMOVATE) 0.05 % Cream Apply 1 g to affected area(s) 2 times a day. Apply to affected skin BID for up to 2 weeks. DO NOT use on FACE 45 g 0   • levothyroxine (SYNTHROID) 100 MCG Tab Take 1 Tab by mouth every day. In morning one hour prior to any food or drink. 30 Tab 3   • metaxalone " "(SKELAXIN) 800 MG Tab Take 1 Tab by mouth 3 times a day. 90 Tab 0   • ibuprofen (MOTRIN) 600 MG Tab Take 1 Tab by mouth every 6 hours as needed. 90 Tab 1   • estradiol (MINIVELLE) 0.0375 MG/24HR patch 1 path BIW 12 Tab 3   • Cholecalciferol (VITAMIN D) 2000 UNITS CAPS Take  by mouth every day.     • CALCIUM-MAGNESIUM-ZINC PO Take  by mouth every day.       No facility-administered encounter medications on file as of 6/12/2017.     Review of Systems   Constitutional: Negative for fever and chills.   HENT: Negative for congestion.    Respiratory: Negative for cough and shortness of breath.    Cardiovascular: Negative for chest pain, palpitations, orthopnea, leg swelling and PND.   Gastrointestinal: Negative for nausea and abdominal pain.   Musculoskeletal: Negative for myalgias.   Skin: Negative for rash.   Neurological: Negative for dizziness, loss of consciousness and headaches.   Endo/Heme/Allergies: Does not bruise/bleed easily.        Objective:   /70 mmHg  Pulse 68  Ht 1.778 m (5' 10\")  Wt 64.864 kg (143 lb)  BMI 20.52 kg/m2  SpO2 96%    Physical Exam   Constitutional: She is oriented to person, place, and time. She appears well-developed and well-nourished. No distress.   HENT:   Mouth/Throat: Oropharynx is clear and moist.   Eyes: Conjunctivae and EOM are normal.   Neck: Neck supple. No JVD present. No thyroid mass present.   Cardiovascular: Normal rate, regular rhythm, S1 normal, S2 normal and normal pulses.  PMI is not displaced.  Exam reveals no gallop.    No murmur heard.  Pulses:       Carotid pulses are 2+ on the right side, and 2+ on the left side.       Radial pulses are 2+ on the right side, and 2+ on the left side.        Femoral pulses are 2+ on the right side, and 2+ on the left side.       Dorsalis pedis pulses are 2+ on the right side, and 2+ on the left side.   No peripheral edema. Left carotid pulsation absent   Pulmonary/Chest: Effort normal and breath sounds normal.   Abdominal: " Soft. Normal appearance. She exhibits no abdominal bruit and no mass. There is no hepatosplenomegaly. There is no tenderness.   Musculoskeletal: Normal range of motion. She exhibits no edema.        Lumbar back: She exhibits no tenderness and no spasm.   Neurological: She is alert and oriented to person, place, and time. She has normal strength.   Skin: Skin is warm and dry. No rash noted. No cyanosis. Nails show no clubbing.   Psychiatric: She has a normal mood and affect.       Assessment:     1. Palpitations  EKG    Echo-Rest/Stress w/o Contrast    HOLTER MONITOR STUDY   2. Carotid occlusion, left     3. Neck pain         Medical Decision Making:  Today's Assessment / Status / Plan:     1. Jaw discomfort with atypical components with mildly abnormal ECG. Will get a stress echo.  2. Check Holter.  3. Left carotid occlusion.

## 2017-06-12 NOTE — Clinical Note
"     [facility]   [address], [address]  [place] [address]  Phone: [phone]  Fax: [phone]              Lianne Abreu  1960    Encounter Date: 6/12/2017    Pedro Rashid M.D.          PROGRESS NOTE:  Subjective:   Lianne Abreu is a 56 y.o. female who presents today in consult on on of Irma Lundberg secondary to prolonged and intermittent jaw pain and neck pain. Workup has shown occluded left internal carotid. Has seen vascular surgery. No known vasculitis in past. No syncope. Good exercise tolerance but intermittent jaw ache and neck ache. ECG with possible old septal MI. No syncope. Works as a  at a [employer]. Positive FH of CAD. No known CVA in past. Nl lipids.     Past Medical History   Diagnosis Date   • Other specified symptom associated with female genital organs    • Unspecified disorder of thyroid    • Anesthesia      sister had \"seizures after surgery\", due to low sodium?   • Unspecified hemorrhagic conditions 01/2015     vaginal bleeding   • Raynaud's syndrome      Past Surgical History   Procedure Laterality Date   • Vaginal hysterectomy scope total  1/30/2015     Performed by Db Rodgers M.D. at [facility]   • Tonsillectomy     • Primary c section     • Tubal coagulation laparoscopic bilateral       Family History   Problem Relation Age of Onset   • Cancer Father      colon cancer   • Thyroid Father    • Heart Disease Father 40     MI   • Cancer Sister      ovarian ca   • Thyroid Sister    • Stroke Neg Hx    • Diabetes Neg Hx    • Cancer Sister      SCCA   • Thyroid Sister      History   Smoking status   • Never Smoker    Smokeless tobacco   • Never Used     No Known Allergies  Outpatient Encounter Prescriptions as of 6/12/2017   Medication Sig Dispense Refill   • Clindamycin Phosphate 1 % Foam 1 g by Apply externally route 2 times a day as needed. 50 g 2   • clobetasol (TEMOVATE) 0.05 % Cream Apply 1 g " "to affected area(s) 2 times a day. Apply to affected skin BID for up to 2 weeks. DO NOT use on FACE 45 g 0   • levothyroxine (SYNTHROID) 100 MCG Tab Take 1 Tab by mouth every day. In morning one hour prior to any food or drink. 30 Tab 3   • metaxalone (SKELAXIN) 800 MG Tab Take 1 Tab by mouth 3 times a day. 90 Tab 0   • ibuprofen (MOTRIN) 600 MG Tab Take 1 Tab by mouth every 6 hours as needed. 90 Tab 1   • estradiol (MINIVELLE) 0.0375 MG/24HR patch 1 path BIW 12 Tab 3   • Cholecalciferol (VITAMIN D) 2000 UNITS CAPS Take  by mouth every day.     • CALCIUM-MAGNESIUM-ZINC PO Take  by mouth every day.       No facility-administered encounter medications on file as of 6/12/2017.     Review of Systems   Constitutional: Negative for fever and chills.   HENT: Negative for congestion.    Respiratory: Negative for cough and shortness of breath.    Cardiovascular: Negative for chest pain, palpitations, orthopnea, leg swelling and PND.   Gastrointestinal: Negative for nausea and abdominal pain.   Musculoskeletal: Negative for myalgias.   Skin: Negative for rash.   Neurological: Negative for dizziness, loss of consciousness and headaches.   Endo/Heme/Allergies: Does not bruise/bleed easily.        Objective:   /70 mmHg  Pulse 68  Ht 1.778 m (5' 10\")  Wt 64.864 kg (143 lb)  BMI 20.52 kg/m2  SpO2 96%    Physical Exam   Constitutional: She is oriented to person, place, and time. She appears well-developed and well-nourished. No distress.   HENT:   Mouth/Throat: Oropharynx is clear and moist.   Eyes: Conjunctivae and EOM are normal.   Neck: Neck supple. No JVD present. No thyroid mass present.   Cardiovascular: Normal rate, regular rhythm, S1 normal, S2 normal and normal pulses.  PMI is not displaced.  Exam reveals no gallop.    No murmur heard.  Pulses:       Carotid pulses are 2+ on the right side, and 2+ on the left side.       Radial pulses are 2+ on the right side, and 2+ on the left side.        Femoral pulses are " 2+ on the right side, and 2+ on the left side.       Dorsalis pedis pulses are 2+ on the right side, and 2+ on the left side.   No peripheral edema. Left carotid pulsation absent   Pulmonary/Chest: Effort normal and breath sounds normal.   Abdominal: Soft. Normal appearance. She exhibits no abdominal bruit and no mass. There is no hepatosplenomegaly. There is no tenderness.   Musculoskeletal: Normal range of motion. She exhibits no edema.        Lumbar back: She exhibits no tenderness and no spasm.   Neurological: She is alert and oriented to person, place, and time. She has normal strength.   Skin: Skin is warm and dry. No rash noted. No cyanosis. Nails show no clubbing.   Psychiatric: She has a normal mood and affect.       Assessment:     1. Palpitations  EKG    Echo-Rest/Stress w/o Contrast    [facility] MONITOR STUDY   2. Carotid occlusion, left     3. Neck pain         Medical Decision Making:  Today's Assessment / Status / Plan:     1. Jaw discomfort with atypical components with mildly abnormal ECG. Will get a stress echo.  2. Check Holter.  3. Left carotid occlusion.        Irma Lundberg, PA-YANCY  [address]  [address]  [place] [address]  VIA In Basket

## 2017-06-12 NOTE — MR AVS SNAPSHOT
"Lianne Macdonaldn   2017 2:00 PM   Office Visit   MRN: [id number]    Department:  [facility]   Dept Phone:  [phone]    Description:  Female : 1960   Provider:  Pedro Rashid M.D.           Reason for Visit     New Patient palp      Allergies as of 2017     No Known Allergies      You were diagnosed with     Palpitations   [785.1.ICD-9-CM]       Carotid occlusion, left   [730049]         Vital Signs     Blood Pressure Pulse Height Weight Body Mass Index Oxygen Saturation    116/70 mmHg 68 1.778 m (5' 10\") 64.864 kg (143 lb) 20.52 kg/m2 96%    Smoking Status                   Never Smoker            Basic Information     Date Of Birth Sex Race Ethnicity Preferred Language    1960 Female White Non- English      Your appointments     2017  8:15 AM   ECHO with [facility]   [facility]ARDIOLOGY [facility])    [address]  [place] [address]   [phone]              Problem List              ICD-10-CM Priority Class Noted - Resolved    S/P hysterectomy Z90.710   2015 - Present    Hypothyroidism E03.9   3/18/2016 - Present    Preventative health care Z00.00   3/18/2016 - Present    Family history of nonmelanoma skin cancer Z80.8   3/18/2016 - Present    Tingling R20.2   2017 - Present    Carotid occlusion, left I65.22   2017 - Present      Health Maintenance        Date Due Completion Dates    PAP SMEAR 1982 ---    MAMMOGRAM 2018, 3/27/2015, 2005    IMM DTaP/Tdap/Td Vaccine (2 - Td) 2024    COLONOSCOPY 2025            Results       Current Immunizations     Hepatitis A Vaccine, Adult 2004, 2004    Hepatitis B Vaccine Recombivax (Adol/Adult) 2004, 2004    Influenza Vaccine Quad Inj (Pf) 2011    Meningococcal Conjugate Vaccine MCV4 (Menveo) 2016    Tdap Vaccine 2014      Below and/or attached are the medications your provider expects you to take. Review all of your home " medications and newly ordered medications with your provider and/or pharmacist. Follow medication instructions as directed by your provider and/or pharmacist. Please keep your medication list with you and share with your provider. Update the information when medications are discontinued, doses are changed, or new medications (including over-the-counter products) are added; and carry medication information at all times in the event of emergency situations     Allergies:  No Known Allergies          Medications  Valid as of: June 12, 2017 -  2:28 PM    Generic Name Brand Name Tablet Size Instructions for use    Calcium-Magnesium-Zinc   Take  by mouth every day.        Cholecalciferol (Cap) Vitamin D 2000 UNITS Take  by mouth every day.        Clindamycin Phosphate (Foam) Clindamycin Phosphate 1 % 1 g by Apply externally route 2 times a day as needed.        Clobetasol Propionate (Cream) TEMOVATE 0.05 % Apply 1 g to affected area(s) 2 times a day. Apply to affected skin BID for up to 2 weeks. DO NOT use on FACE        Estradiol (PATCH BIWEEKLY) VIVELLE 0.0375 MG/24HR 1 path BIW        Ibuprofen (Tab) MOTRIN 600 MG Take 1 Tab by mouth every 6 hours as needed.        Levothyroxine Sodium (Tab) SYNTHROID 100 MCG Take 1 Tab by mouth every day. In morning one hour prior to any food or drink.        Metaxalone (Tab) SKELAXIN 800 MG Take 1 Tab by mouth 3 times a day.        .                 Medicines prescribed today were sent to:     [facility] [address] [place] [address], [place] - [address]    [address] [facility] [address]    Phone: [phone] Fax: [phone]    Open 24 Hours?: No      Medication refill instructions:       If your prescription bottle indicates you have medication refills left, it is not necessary to call your provider’s office. Please contact your pharmacy and they will refill your medication.    If your prescription bottle indicates you do not have any refills left, you may request refills at  any time through one of the following ways: The online [employer] system (except Urgent Care), by calling your provider’s office, or by asking your pharmacy to contact your provider’s office with a refill request. Medication refills are processed only during regular business hours and may not be available until the next business day. Your provider may request additional information or to have a follow-up visit with you prior to refilling your medication.   *Please Note: Medication refills are assigned a new Rx number when refilled electronically. Your pharmacy may indicate that no refills were authorized even though a new prescription for the same medication is available at the pharmacy. Please request the medicine by name with the pharmacy before contacting your provider for a refill.        Your To Do List     Future Labs/Procedures Complete By Expires    Echo-Rest/Stress w/o Contrast  As directed 6/12/2018         [employer] Access Code: Activation code not generated  Current [employer] Status: Active

## 2017-06-27 ENCOUNTER — TELEPHONE (OUTPATIENT)
Dept: CARDIOLOGY | Facility: MEDICAL CENTER | Age: 57
End: 2017-06-27

## 2017-06-27 NOTE — TELEPHONE ENCOUNTER
Spoke with pt, she only gets palp in the school year. She is not having them now in summertime now that she is relaxed. Advised she should wear when she is having them to get a better idea of the problem. She will reschedule zio to school year. She will still do echo.

## 2017-06-27 NOTE — TELEPHONE ENCOUNTER
----- Message from Vee Penny sent at 6/27/2017 10:15 AM PDT -----  Regarding: question about getting Zio patch on 06/28.  MAX/Gabby      Patient has appt to get Zio patch tomorrow and she wants to know if it would be better to wait to get the patch when she goes back to work in August. She can be reached at [phone].

## 2017-07-05 ENCOUNTER — TELEPHONE (OUTPATIENT)
Dept: MEDICAL GROUP | Facility: MEDICAL CENTER | Age: 57
End: 2017-07-05

## 2017-07-05 DIAGNOSIS — E03.9 HYPOTHYROIDISM, UNSPECIFIED TYPE: ICD-10-CM

## 2017-07-05 RX ORDER — LEVOTHYROXINE SODIUM 0.1 MG/1
100 TABLET ORAL DAILY
Qty: 90 TAB | Refills: 3 | Status: SHIPPED | OUTPATIENT
Start: 2017-07-05 | End: 2018-06-11 | Stop reason: SDUPTHER

## 2017-07-27 ENCOUNTER — HOSPITAL ENCOUNTER (OUTPATIENT)
Dept: CARDIOLOGY | Facility: MEDICAL CENTER | Age: 57
End: 2017-07-27
Attending: PHYSICIAN ASSISTANT
Payer: COMMERCIAL

## 2017-07-27 ENCOUNTER — HOSPITAL ENCOUNTER (OUTPATIENT)
Dept: CARDIOLOGY | Facility: MEDICAL CENTER | Age: 57
End: 2017-07-27
Attending: INTERNAL MEDICINE
Payer: COMMERCIAL

## 2017-07-27 DIAGNOSIS — R01.1 MURMUR: ICD-10-CM

## 2017-07-27 DIAGNOSIS — I44.0 AV BLOCK, 1ST DEGREE: ICD-10-CM

## 2017-07-27 DIAGNOSIS — R00.2 PALPITATIONS: ICD-10-CM

## 2017-07-27 DIAGNOSIS — R20.2 TINGLING: ICD-10-CM

## 2017-07-27 LAB
LV EJECT FRACT  99904: 65
LV EJECT FRACT  99904: 70
LV EJECT FRACT MOD 2C 99903: 70.46
LV EJECT FRACT MOD 4C 99902: 59.95
LV EJECT FRACT MOD BP 99901: 65.26

## 2017-07-27 PROCEDURE — 93306 TTE W/DOPPLER COMPLETE: CPT

## 2017-07-27 PROCEDURE — 93306 TTE W/DOPPLER COMPLETE: CPT | Mod: 26,59 | Performed by: INTERNAL MEDICINE

## 2017-07-27 PROCEDURE — 93350 STRESS TTE ONLY: CPT | Mod: 26 | Performed by: INTERNAL MEDICINE

## 2017-07-27 PROCEDURE — 93017 CV STRESS TEST TRACING ONLY: CPT

## 2017-07-27 PROCEDURE — 93018 CV STRESS TEST I&R ONLY: CPT | Performed by: INTERNAL MEDICINE

## 2017-07-27 PROCEDURE — 93350 STRESS TTE ONLY: CPT

## 2017-08-08 ENCOUNTER — TELEPHONE (OUTPATIENT)
Dept: CARDIOLOGY | Facility: MEDICAL CENTER | Age: 57
End: 2017-08-08

## 2017-08-08 NOTE — TELEPHONE ENCOUNTER
"Notes Recorded by Gabby Delgadillo R.N. on 8/8/2017 at 10:58 AM  Lm with \"nml results\" on cell, call with questions  Notes Recorded by Gabby Delgadillo R.N. on 8/7/2017 at 4:45 PM  Lm to call for results  Notes Recorded by Pedro Rashid M.D. on 8/6/2017 at 2:08 PM  Tell her the stress echo is nl  Notes Recorded by Shanda Elam R.N. on 7/28/2017 at 12:51 PM  Next appt: 08/11/2017 at 01:30 PM in Cardiology ([facility])   Follow up DS 9/15  "

## 2017-08-11 ENCOUNTER — NON-PROVIDER VISIT (OUTPATIENT)
Dept: CARDIOLOGY | Facility: MEDICAL CENTER | Age: 57
End: 2017-08-11
Attending: PHYSICIAN ASSISTANT
Payer: COMMERCIAL

## 2017-08-11 DIAGNOSIS — I44.0 AV BLOCK, 1ST DEGREE: ICD-10-CM

## 2017-08-11 DIAGNOSIS — R00.2 PALPITATIONS: ICD-10-CM

## 2017-08-11 DIAGNOSIS — R20.2 TINGLING: ICD-10-CM

## 2017-08-14 ENCOUNTER — TELEPHONE (OUTPATIENT)
Dept: CARDIOLOGY | Facility: MEDICAL CENTER | Age: 57
End: 2017-08-14

## 2017-08-23 ENCOUNTER — TELEPHONE (OUTPATIENT)
Dept: CARDIOLOGY | Facility: MEDICAL CENTER | Age: 57
End: 2017-08-23

## 2017-08-23 PROCEDURE — 0298T PR EXT ECG > 48HR TO 21 DAY REVIEW AND INTERPRETATN: CPT | Performed by: INTERNAL MEDICINE

## 2017-08-23 PROCEDURE — 0296T PR EXT ECG > 48HR TO 21 DAY RCRD W/CONECT INTL RCRD: CPT | Performed by: INTERNAL MEDICINE

## 2017-09-29 ENCOUNTER — HOSPITAL ENCOUNTER (OUTPATIENT)
Dept: LAB | Facility: MEDICAL CENTER | Age: 57
End: 2017-09-29
Attending: OBSTETRICS & GYNECOLOGY
Payer: COMMERCIAL

## 2017-09-29 PROCEDURE — 85306 CLOT INHIBIT PROT S FREE: CPT

## 2017-09-29 PROCEDURE — 85303 CLOT INHIBIT PROT C ACTIVITY: CPT

## 2017-09-29 PROCEDURE — 85300 ANTITHROMBIN III ACTIVITY: CPT

## 2017-09-29 PROCEDURE — 85220 BLOOC CLOT FACTOR V TEST: CPT

## 2017-09-29 PROCEDURE — 36415 COLL VENOUS BLD VENIPUNCTURE: CPT

## 2017-10-01 LAB
FACT V ACT/NOR PPP: 66 % (ref 62–140)
PROT C ACT/NOR PPP: 109 % (ref 83–168)
PROT S ACT/NOR PPP: 67 % (ref 57–131)

## 2017-10-02 LAB — AT III ACT/NOR PPP CHRO: 109 % (ref 76–128)

## 2018-05-15 ENCOUNTER — HOSPITAL ENCOUNTER (OUTPATIENT)
Dept: RADIOLOGY | Facility: MEDICAL CENTER | Age: 58
End: 2018-05-15
Attending: PHYSICIAN ASSISTANT
Payer: COMMERCIAL

## 2018-05-15 DIAGNOSIS — Z12.31 SCREENING MAMMOGRAM, ENCOUNTER FOR: ICD-10-CM

## 2018-05-15 PROCEDURE — 77063 BREAST TOMOSYNTHESIS BI: CPT

## 2018-05-17 ENCOUNTER — TELEPHONE (OUTPATIENT)
Dept: MEDICAL GROUP | Facility: LAB | Age: 58
End: 2018-05-17

## 2018-05-18 ENCOUNTER — TELEPHONE (OUTPATIENT)
Dept: MEDICAL GROUP | Facility: LAB | Age: 58
End: 2018-05-18

## 2018-05-18 NOTE — TELEPHONE ENCOUNTER
ESTABLISHED PATIENT PRE-VISIT PLANNING     Note: Patient will not be contacted if there is no indication to call.     1.  Reviewed notes from the last few office visits within the medical group: Yes    2.  If any orders were placed at last visit or intended to be done for this visit (i.e. 6 mos follow-up), do we have Results/Consult Notes?        •  Labs - Labs ordered, completed on 5/27/17 and results are in chart.       •  Imaging - Imaging ordered, completed and results are in chart.       •  Referrals - No referrals were ordered at last office visit.    3. Is this appointment scheduled as a Hospital Follow-Up? No    4.  Immunizations were updated in Epic using WebIZ?: Epic matches WebIZ       •  Web Iz Recommendations: Patient is up to date on all vaccines    5.  Patient is due for the following Health Maintenance Topics:   Health Maintenance Due   Topic Date Due   • PAP SMEAR  07/18/1982       - Patient has completed Patient is up to date on all vaccines Immunization(s) per WebIZ. Chart has been updated.    6.  MDX printed for Provider? NO    7.  Patient was NOT informed to arrive 15 min prior to their scheduled appointment and bring in their medication bottles.

## 2018-05-21 ENCOUNTER — OFFICE VISIT (OUTPATIENT)
Dept: MEDICAL GROUP | Facility: LAB | Age: 58
End: 2018-05-21
Payer: COMMERCIAL

## 2018-05-21 VITALS
TEMPERATURE: 98.2 F | HEART RATE: 55 BPM | WEIGHT: 142 LBS | SYSTOLIC BLOOD PRESSURE: 108 MMHG | OXYGEN SATURATION: 99 % | BODY MASS INDEX: 20.33 KG/M2 | DIASTOLIC BLOOD PRESSURE: 68 MMHG | HEIGHT: 70 IN | RESPIRATION RATE: 12 BRPM

## 2018-05-21 DIAGNOSIS — R19.7 DIARRHEA, UNSPECIFIED TYPE: ICD-10-CM

## 2018-05-21 DIAGNOSIS — K62.5 BRBPR (BRIGHT RED BLOOD PER RECTUM): ICD-10-CM

## 2018-05-21 DIAGNOSIS — Z13.1 SCREENING FOR DIABETES MELLITUS: ICD-10-CM

## 2018-05-21 DIAGNOSIS — R10.13 EPIGASTRIC DISCOMFORT: ICD-10-CM

## 2018-05-21 DIAGNOSIS — Z13.6 SCREENING FOR CARDIOVASCULAR CONDITION: ICD-10-CM

## 2018-05-21 DIAGNOSIS — E03.9 HYPOTHYROIDISM, UNSPECIFIED TYPE: ICD-10-CM

## 2018-05-21 PROCEDURE — 99214 OFFICE O/P EST MOD 30 MIN: CPT | Performed by: PHYSICIAN ASSISTANT

## 2018-05-21 ASSESSMENT — PAIN SCALES - GENERAL: PAINLEVEL: NO PAIN

## 2018-05-21 ASSESSMENT — PATIENT HEALTH QUESTIONNAIRE - PHQ9: CLINICAL INTERPRETATION OF PHQ2 SCORE: 0

## 2018-05-21 NOTE — PROGRESS NOTES
"Subjective:     Chief Complaint   Patient presents with   • Diarrhea     after running, started three weeks ago   • Bloody Stools     Lianne Abreu is a 57 y.o. female here today for diarrhea and BRBPR as listed below    3 weeks ago went for run  At the time she felt stomach rumbling and had diarrhea with blood. Bright red blood.   Cut back on running, instead swimming and some little sprinting.   Then last week went for another run- 30min in and had same feeling then diarrhea with bright red blood.   Typically BM regular, formed.   For several years has \"hunger pain\" about 10-20min after eating. Sharp to dull ache with some nausea in epigastric region.   Noticed that doesn't have this with some foods but doesn't know what flares it.   Has been working on weight management.   Stated her normal weight has been 135# but lately has been 140#.   Over last year has been grazing instead of having 3 meals daily. therefore she started Fasting on Sunday and wednesdays for 3 mo.   Pt does a lot of traveling, hx of dysentery with trip 1 year ago- treated at   No recent abx use.   Personal hx Hashimotos. Last labs 1 year ago WNL  Sister- lupus and celiac positive. Other sister- celiac positive. 3rd sister- ITP.   Colonoscopy- 1/9/15 normal, no polyps (10 years)    Current medicines (including changes today)  Current Outpatient Prescriptions   Medication Sig Dispense Refill   • levothyroxine (SYNTHROID) 100 MCG Tab Take 1 Tab by mouth every day. In morning one hour prior to any food or drink. 90 Tab 3   • Clindamycin Phosphate 1 % Foam 1 g by Apply externally route 2 times a day as needed. 50 g 2   • clobetasol (TEMOVATE) 0.05 % Cream Apply 1 g to affected area(s) 2 times a day. Apply to affected skin BID for up to 2 weeks. DO NOT use on FACE 45 g 0   • metaxalone (SKELAXIN) 800 MG Tab Take 1 Tab by mouth 3 times a day. 90 Tab 0   • ibuprofen (MOTRIN) 600 MG Tab Take 1 Tab by mouth every 6 hours as needed. 90 Tab 1   • " "estradiol (MINIVELLE) 0.0375 MG/24HR patch 1 path BIW 12 Tab 3   • Cholecalciferol (VITAMIN D) 2000 UNITS CAPS Take  by mouth every day.     • CALCIUM-MAGNESIUM-ZINC PO Take  by mouth every day.       No current facility-administered medications for this visit.      She  has a past medical history of Anesthesia; Other specified symptom associated with female genital organs; Raynaud's syndrome; Unspecified disorder of thyroid; and Unspecified hemorrhagic conditions (01/2015).    ROS   Denies fever, chills, cp, sob, vomiting, constipation       Objective:     Blood pressure 108/68, pulse (!) 55, temperature 36.8 °C (98.2 °F), resp. rate 12, height 1.778 m (5' 10\"), weight 64.4 kg (142 lb), SpO2 99 %. Body mass index is 20.37 kg/m².   Physical Exam:  Alert, oriented in no acute distress.  Eye contact is good, speech goal directed, affect calm  HEENT: conjunctiva non-injected, sclera non-icteric, PERRL.  Neck No adenopathy or masses in the neck or supraclavicular regions.  Lungs: clear to auscultation bilaterally with good excursion.  CV: regular rate and rhythm.  Abdomen: soft, mild discomfort throughout, no rebound tenderness, neg mcmurphys sign, no mcburney point tenderness, no HSM, No CVAT  : single non inflamed hemorrhoid at 6 o clock, no erythema, edema, skin intact.   Ext: no edema, color normal, peripheral pulses 2+, temperature normal      Assessment and Plan:   The following treatment plan was discussed     1. Diarrhea, unspecified type/BRBPR  Potentially from hemorrhoid. Recent normal colonoscopy.   2 episodes of diarrhea, has autoimmune personal and family- labs ordered.   With traveling and epigastric discomfort although this has been years ordered h pylori, lipase.   Discussed GERD/PUD, pancreatitis (chronic)- changing diet. Pt wants to avoid any medications even OTC meds therefore discussed changing diet, elimination diet.   If persists may need US or discussed trial of zantac for epigastric discomfort. "     Also ordered other annual labs. Pt only likes to be see once yearly if possible although we will keep our annual visit in near future.     Followup: Return in about 4 weeks (around 6/18/2018) for annual, abdominal discomfort.           Please note that this dictation was created using voice recognition software. I have made every reasonable attempt to correct obvious errors, but I expect that there are errors of grammar and possibly content that I did not discover before finalizing the note.

## 2018-05-23 ENCOUNTER — HOSPITAL ENCOUNTER (OUTPATIENT)
Dept: LAB | Facility: MEDICAL CENTER | Age: 58
End: 2018-05-23
Attending: PHYSICIAN ASSISTANT
Payer: COMMERCIAL

## 2018-05-23 DIAGNOSIS — E03.9 HYPOTHYROIDISM, UNSPECIFIED TYPE: ICD-10-CM

## 2018-05-23 DIAGNOSIS — Z13.6 SCREENING FOR CARDIOVASCULAR CONDITION: ICD-10-CM

## 2018-05-23 DIAGNOSIS — R19.7 DIARRHEA, UNSPECIFIED TYPE: ICD-10-CM

## 2018-05-23 DIAGNOSIS — Z13.1 SCREENING FOR DIABETES MELLITUS: ICD-10-CM

## 2018-05-23 DIAGNOSIS — R10.13 EPIGASTRIC DISCOMFORT: ICD-10-CM

## 2018-05-23 DIAGNOSIS — K62.5 BRBPR (BRIGHT RED BLOOD PER RECTUM): ICD-10-CM

## 2018-05-23 LAB
ALBUMIN SERPL BCP-MCNC: 4.6 G/DL (ref 3.2–4.9)
ALBUMIN/GLOB SERPL: 2 G/DL
ALP SERPL-CCNC: 72 U/L (ref 30–99)
ALT SERPL-CCNC: 36 U/L (ref 2–50)
ANION GAP SERPL CALC-SCNC: 5 MMOL/L (ref 0–11.9)
AST SERPL-CCNC: 30 U/L (ref 12–45)
BASOPHILS # BLD AUTO: 1 % (ref 0–1.8)
BASOPHILS # BLD: 0.06 K/UL (ref 0–0.12)
BILIRUB SERPL-MCNC: 0.6 MG/DL (ref 0.1–1.5)
BUN SERPL-MCNC: 20 MG/DL (ref 8–22)
CALCIUM SERPL-MCNC: 9.6 MG/DL (ref 8.5–10.5)
CHLORIDE SERPL-SCNC: 106 MMOL/L (ref 96–112)
CHOLEST SERPL-MCNC: 118 MG/DL (ref 100–199)
CO2 SERPL-SCNC: 27 MMOL/L (ref 20–33)
CREAT SERPL-MCNC: 0.79 MG/DL (ref 0.5–1.4)
EOSINOPHIL # BLD AUTO: 0.16 K/UL (ref 0–0.51)
EOSINOPHIL NFR BLD: 2.6 % (ref 0–6.9)
ERYTHROCYTE [DISTWIDTH] IN BLOOD BY AUTOMATED COUNT: 43.7 FL (ref 35.9–50)
GLOBULIN SER CALC-MCNC: 2.3 G/DL (ref 1.9–3.5)
GLUCOSE SERPL-MCNC: 92 MG/DL (ref 65–99)
HCT VFR BLD AUTO: 40.6 % (ref 37–47)
HDLC SERPL-MCNC: 60 MG/DL
HGB BLD-MCNC: 13.5 G/DL (ref 12–16)
IMM GRANULOCYTES # BLD AUTO: 0.01 K/UL (ref 0–0.11)
IMM GRANULOCYTES NFR BLD AUTO: 0.2 % (ref 0–0.9)
LDLC SERPL CALC-MCNC: 50 MG/DL
LIPASE SERPL-CCNC: 32 U/L (ref 11–82)
LYMPHOCYTES # BLD AUTO: 1.47 K/UL (ref 1–4.8)
LYMPHOCYTES NFR BLD: 24.3 % (ref 22–41)
MCH RBC QN AUTO: 31.9 PG (ref 27–33)
MCHC RBC AUTO-ENTMCNC: 33.3 G/DL (ref 33.6–35)
MCV RBC AUTO: 96 FL (ref 81.4–97.8)
MONOCYTES # BLD AUTO: 0.59 K/UL (ref 0–0.85)
MONOCYTES NFR BLD AUTO: 9.8 % (ref 0–13.4)
NEUTROPHILS # BLD AUTO: 3.75 K/UL (ref 2–7.15)
NEUTROPHILS NFR BLD: 62.1 % (ref 44–72)
NRBC # BLD AUTO: 0 K/UL
NRBC BLD-RTO: 0 /100 WBC
PLATELET # BLD AUTO: 149 K/UL (ref 164–446)
PMV BLD AUTO: 11.7 FL (ref 9–12.9)
POTASSIUM SERPL-SCNC: 4.4 MMOL/L (ref 3.6–5.5)
PROT SERPL-MCNC: 6.9 G/DL (ref 6–8.2)
RBC # BLD AUTO: 4.23 M/UL (ref 4.2–5.4)
SODIUM SERPL-SCNC: 138 MMOL/L (ref 135–145)
T4 FREE SERPL-MCNC: 0.91 NG/DL (ref 0.53–1.43)
TRIGL SERPL-MCNC: 42 MG/DL (ref 0–149)
TSH SERPL DL<=0.005 MIU/L-ACNC: 0.57 UIU/ML (ref 0.38–5.33)
WBC # BLD AUTO: 6 K/UL (ref 4.8–10.8)

## 2018-05-23 PROCEDURE — 36415 COLL VENOUS BLD VENIPUNCTURE: CPT

## 2018-05-23 PROCEDURE — 80053 COMPREHEN METABOLIC PANEL: CPT

## 2018-05-23 PROCEDURE — 83516 IMMUNOASSAY NONANTIBODY: CPT

## 2018-05-23 PROCEDURE — 82784 ASSAY IGA/IGD/IGG/IGM EACH: CPT

## 2018-05-23 PROCEDURE — 84443 ASSAY THYROID STIM HORMONE: CPT

## 2018-05-23 PROCEDURE — 80061 LIPID PANEL: CPT

## 2018-05-23 PROCEDURE — 83690 ASSAY OF LIPASE: CPT

## 2018-05-23 PROCEDURE — 84439 ASSAY OF FREE THYROXINE: CPT

## 2018-05-23 PROCEDURE — 85025 COMPLETE CBC W/AUTO DIFF WBC: CPT

## 2018-05-25 ENCOUNTER — TELEPHONE (OUTPATIENT)
Dept: MEDICAL GROUP | Facility: LAB | Age: 58
End: 2018-05-25

## 2018-05-25 DIAGNOSIS — R19.7 DIARRHEA, UNSPECIFIED TYPE: ICD-10-CM

## 2018-05-25 DIAGNOSIS — D69.6 THROMBOCYTOPENIA (HCC): ICD-10-CM

## 2018-05-25 LAB
IGA SERPL-MCNC: 70 MG/DL (ref 68–408)
TTG IGA SER IA-ACNC: 0 U/ML (ref 0–3)

## 2018-05-26 ENCOUNTER — HOSPITAL ENCOUNTER (OUTPATIENT)
Dept: LAB | Facility: MEDICAL CENTER | Age: 58
End: 2018-05-26
Attending: PHYSICIAN ASSISTANT
Payer: COMMERCIAL

## 2018-05-26 DIAGNOSIS — R19.7 DIARRHEA, UNSPECIFIED TYPE: ICD-10-CM

## 2018-05-26 PROCEDURE — 82952 GTT-ADDED SAMPLES: CPT

## 2018-05-26 PROCEDURE — 36415 COLL VENOUS BLD VENIPUNCTURE: CPT

## 2018-05-26 PROCEDURE — 82951 GLUCOSE TOLERANCE TEST (GTT): CPT

## 2018-05-28 ENCOUNTER — HOSPITAL ENCOUNTER (OUTPATIENT)
Facility: MEDICAL CENTER | Age: 58
End: 2018-05-28
Attending: PHYSICIAN ASSISTANT
Payer: COMMERCIAL

## 2018-05-28 PROCEDURE — 87338 HPYLORI STOOL AG IA: CPT

## 2018-05-28 PROCEDURE — 87493 C DIFF AMPLIFIED PROBE: CPT

## 2018-05-30 LAB
C DIFF DNA SPEC QL NAA+PROBE: NEGATIVE
C DIFF TOX GENS STL QL NAA+PROBE: NEGATIVE
H PYLORI AG STL QL IA: NOT DETECTED

## 2018-05-31 LAB — TEST NAME 95000: NORMAL

## 2018-06-08 ENCOUNTER — TELEPHONE (OUTPATIENT)
Dept: MEDICAL GROUP | Facility: LAB | Age: 58
End: 2018-06-08

## 2018-06-08 NOTE — TELEPHONE ENCOUNTER
ESTABLISHED PATIENT PRE-VISIT PLANNING     Note: Patient will not be contacted if there is no indication to call.     1.  Reviewed notes from the last few office visits within the medical group: Yes    2.  If any orders were placed at last visit or intended to be done for this visit (i.e. 6 mos follow-up), do we have Results/Consult Notes?        •  Labs - Labs were not ordered at last office visit.       •  Imaging - Imaging was not ordered at last office visit.       •  Referrals - No referrals were ordered at last office visit.    3. Is this appointment scheduled as a Hospital Follow-Up? No    4.  Immunizations were updated in Epic using WebIZ?: Epic matches WebIZ       •  Web Iz Recommendations: HEPATITIS A , HEPATITIS B, MMR  and VARICELLA (Chicken Pox)     5.  Patient is due for the following Health Maintenance Topics:   Health Maintenance Due   Topic Date Due   • PAP SMEAR  07/18/1982       - Patient has completed TDAP Immunization(s) per WebIZ. Chart has been updated.    6.  MDX printed for Provider? NO    7.  Patient was NOT informed to arrive 15 min prior to their scheduled appointment and bring in their medication bottles.

## 2018-06-11 ENCOUNTER — OFFICE VISIT (OUTPATIENT)
Dept: MEDICAL GROUP | Facility: LAB | Age: 58
End: 2018-06-11
Payer: COMMERCIAL

## 2018-06-11 VITALS
DIASTOLIC BLOOD PRESSURE: 62 MMHG | OXYGEN SATURATION: 98 % | WEIGHT: 144 LBS | SYSTOLIC BLOOD PRESSURE: 82 MMHG | HEIGHT: 70 IN | RESPIRATION RATE: 12 BRPM | BODY MASS INDEX: 20.62 KG/M2 | HEART RATE: 55 BPM | TEMPERATURE: 98.2 F

## 2018-06-11 DIAGNOSIS — E03.9 HYPOTHYROIDISM, UNSPECIFIED TYPE: ICD-10-CM

## 2018-06-11 DIAGNOSIS — I65.22 CAROTID OCCLUSION, LEFT: ICD-10-CM

## 2018-06-11 DIAGNOSIS — M54.50 CHRONIC BILATERAL LOW BACK PAIN WITHOUT SCIATICA: ICD-10-CM

## 2018-06-11 DIAGNOSIS — R10.84 GENERALIZED ABDOMINAL PAIN: ICD-10-CM

## 2018-06-11 DIAGNOSIS — R19.7 DIARRHEA, UNSPECIFIED TYPE: ICD-10-CM

## 2018-06-11 DIAGNOSIS — G89.29 CHRONIC BILATERAL LOW BACK PAIN WITHOUT SCIATICA: ICD-10-CM

## 2018-06-11 DIAGNOSIS — Z90.710 S/P HYSTERECTOMY: ICD-10-CM

## 2018-06-11 DIAGNOSIS — Z00.00 PREVENTATIVE HEALTH CARE: ICD-10-CM

## 2018-06-11 DIAGNOSIS — Z80.8 FAMILY HISTORY OF NONMELANOMA SKIN CANCER: ICD-10-CM

## 2018-06-11 PROBLEM — R20.2 TINGLING: Status: RESOLVED | Noted: 2017-06-02 | Resolved: 2018-06-11

## 2018-06-11 PROBLEM — M54.2 NECK PAIN: Status: RESOLVED | Noted: 2017-06-12 | Resolved: 2018-06-11

## 2018-06-11 PROCEDURE — 99396 PREV VISIT EST AGE 40-64: CPT | Performed by: PHYSICIAN ASSISTANT

## 2018-06-11 RX ORDER — METAXALONE 800 MG/1
800 TABLET ORAL 3 TIMES DAILY
Qty: 60 TAB | Refills: 0 | Status: SHIPPED | OUTPATIENT
Start: 2018-06-11

## 2018-06-11 RX ORDER — CLINDAMYCIN PHOSPHATE 10 MG/G
1 AEROSOL, FOAM TOPICAL 2 TIMES DAILY PRN
Qty: 50 G | Refills: 2 | Status: SHIPPED | OUTPATIENT
Start: 2018-06-11

## 2018-06-11 RX ORDER — LEVOTHYROXINE SODIUM 0.1 MG/1
100 TABLET ORAL DAILY
Qty: 90 TAB | Refills: 3 | Status: SHIPPED | OUTPATIENT
Start: 2018-06-11 | End: 2019-07-23 | Stop reason: SDUPTHER

## 2018-06-11 NOTE — ASSESSMENT & PLAN NOTE
This is chronic. Pt treating with levothyroxine 100mcg qd.   Taking medicine as directed on empty stomach with water and waits at last 30 minutes to consume other food or beverage.   Denies palpitations, skin changes, temperature intolerance, changes in bowel habits.  Results for TANI LEE (MRN [id number]) as of 6/11/2018 09:23   Ref. Range 5/23/2018 16:45   TSH Latest Ref Range: 0.380 - 5.330 uIU/mL 0.570   Free T-4 Latest Ref Range: 0.53 - 1.43 ng/dL 0.91

## 2018-06-11 NOTE — ASSESSMENT & PLAN NOTE
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   Since then her symptoms with her loss of vision in left eye and tingling in face have never returned.   5/19/17 CTA neck- 1.  Diminutive left common carotid artery supplying the left external carotid artery. No left carotid artery bifurcation and no left internal carotid artery identified. Uncertain whether developmental or the result of old thrombotic or dissection event.  2.  Right carotid artery system well-opacified and patent.  3.  The vertebral arteries are well-opacified and patent.  4.  Tiny nodules within the thyroid gland.  5/19/17 CTA head- 1.  The posterior circulation is patent.  2.  The left internal carotid artery is occluded.  3.  The left M1 segment and sylvian artery branches of the left middle cerebral artery are opacified via the posterior communicating artery.  4.  The left A1 segment is not opacified consistent with occlusion.  5.  A dominant right anterior cerebral artery and diminutive left anterior cerebral artery are demonstrated.  6.  No intracranial mass effect or acute hemorrhage.

## 2018-06-11 NOTE — LETTER
[facility]  Irma Lundberg P.A.-C.  [address]  [place] [address]  Fax: [phone]   Authorization for Release/Disclosure of   Protected Health Information   Name: LIANNE ABREU : 1960 SSN: [id number]   Address:  [address] [place] [address] Phone:    [phone] (home)    I authorize the entity listed below to release/disclose the PHI below to:   [facility]Irma Lundberg P.A.-C. and Irma Lundberg P.A.-C.   Provider or Entity Name:  Dr. Db Rodgers    Address   [facility], [facility], [facility]   Phone:      Fax:  [phone]   Reason for request: continuity of care   Information to be released:    [  ] LAST COLONOSCOPY,  including any PATH REPORT and follow-up  [  ] LAST FIT/COLOGUARD RESULT [  ] LAST DEXA  [  ] LAST MAMMOGRAM  [  ] LAST PAP  [  ] LAST LABS [  ] RETINA EXAM REPORT  [  ] IMMUNIZATION RECORDS  [  ] Release all info      [  ] Check here and initial the line next to each item to release ALL health information INCLUDING  _____ Care and treatment for drug and / or alcohol abuse  _____ HIV testing, infection status, or AIDS  _____ Genetic Testing    DATES OF SERVICE OR TIME PERIOD TO BE DISCLOSED: _____________  I understand and acknowledge that:  * This Authorization may be revoked at any time by you in writing, except if your health information has already been used or disclosed.  * Your health information that will be used or disclosed as a result of you signing this authorization could be re-disclosed by the recipient. If this occurs, your re-disclosed health information may no longer be protected by State or Federal laws.  * You may refuse to sign this Authorization. Your refusal will not affect your ability to obtain treatment.  * This Authorization becomes effective upon signing and will  on (date) __________.      If no date is indicated, this Authorization will  one (1) year from the signature date.    Name: Lianne Abreu    Signature:   Date:      6/11/2018       PLEASE FAX REQUESTED RECORDS BACK TO: [phone]

## 2018-06-11 NOTE — PROGRESS NOTES
Subjective:     Chief Complaint   Patient presents with   • Annual Exam     Tani Abreu is a 57 y.o. female here today for annual as listed below    PAP- 2016 with annual exam by Dr. Rodgers.   mammo- 5/15/18  TDap- 8/8/14  Colonoscopy- 1/9/15    Hypothyroidism  This is chronic. Pt treating with levothyroxine 100mcg qd.   Taking medicine as directed on empty stomach with water and waits at last 30 minutes to consume other food or beverage.   Denies palpitations, skin changes, temperature intolerance, changes in bowel habits.  Results for TANI ABREU (MRN [id number]) as of 6/11/2018 09:23   Ref. Range 5/23/2018 16:45   TSH Latest Ref Range: 0.380 - 5.330 uIU/mL 0.570   Free T-4 Latest Ref Range: 0.53 - 1.43 ng/dL 0.91       S/P hysterectomy  Total hysterectomy including ovaries- 2/2 benign fibroid and consists.   3/10/16- PAP, Dr. Rodgers.   Denies any vaginal bleeding  Treating with estradiol patch.   Denies CP, abdominal pain, N/V, dysuria, freq, urgency, hematuria, change in vaginal discharge, lesions or sores    Family history of nonmelanoma skin cancer  Fhx- Sister and father- non melanoma skin cancer  Followed by Dr. Nugent annually.   Also use to pick at skin and has scaring on arms bilaterally.  This is no longer an issue.      Carotid occlusion, left  Was seen by cardiologist and vascular surgeon who eventually stated this was congenital and no treatment was needed.   Since then her symptoms with her loss of vision in left eye and tingling in face have never returned.   5/19/17 CTA neck- 1.  Diminutive left common carotid artery supplying the left external carotid artery. No left carotid artery bifurcation and no left internal carotid artery identified. Uncertain whether developmental or the result of old thrombotic or dissection event.  2.  Right carotid artery system well-opacified and patent.  3.  The vertebral arteries are well-opacified and patent.  4.  Tiny nodules within the thyroid gland.  5/19/17  CTA head- 1.  The posterior circulation is patent.  2.  The left internal carotid artery is occluded.  3.  The left M1 segment and sylvian artery branches of the left middle cerebral artery are opacified via the posterior communicating artery.  4.  The left A1 segment is not opacified consistent with occlusion.  5.  A dominant right anterior cerebral artery and diminutive left anterior cerebral artery are demonstrated.  6.  No intracranial mass effect or acute hemorrhage.    Generalized abdominal pain  Abdominal pain improved.   Still mild in epigastric region but no further in mid abdomin and no further diarrhea or BRBPR after running.   She stopped drinking whole milk, switched to coconut and almond.   Also stopped soft ice cream which seemed to help the most.   She thinks back to when she started garlic and will be switching this soon.   Has planned to start elimination diet to see if there is anything else that could be causing her intermittent diarrhea and abdominal discomfort.  She has seen gastroenterology in the past but would like referral just in case this is needed if symptoms return.   Today denies any abdominal discomfort, nausea, vomiting, diarrhea, constipation, blood in stool.        Current medicines (including changes today)  Current Outpatient Prescriptions   Medication Sig Dispense Refill   • Clindamycin Phosphate 1 % Foam 1 g by Apply externally route 2 times a day as needed. 50 g 2   • levothyroxine (SYNTHROID) 100 MCG Tab Take 1 Tab by mouth every day. In morning one hour prior to any food or drink. 90 Tab 3   • metaxalone (SKELAXIN) 800 MG Tab Take 1 Tab by mouth 3 times a day. 60 Tab 0   • clobetasol (TEMOVATE) 0.05 % Cream Apply 1 g to affected area(s) 2 times a day. Apply to affected skin BID for up to 2 weeks. DO NOT use on FACE 45 g 0   • ibuprofen (MOTRIN) 600 MG Tab Take 1 Tab by mouth every 6 hours as needed. 90 Tab 1   • estradiol (MINIVELLE) 0.0375 MG/24HR patch 1 path BIW 12 Tab 3  "  • Cholecalciferol (VITAMIN D) 2000 UNITS CAPS Take  by mouth every day.     • CALCIUM-MAGNESIUM-ZINC PO Take  by mouth every day.       No current facility-administered medications for this visit.      She  has a past medical history of Anesthesia; Other specified symptom associated with female genital organs; Raynaud's syndrome; Unspecified disorder of thyroid; and Unspecified hemorrhagic conditions (01/2015).    ROS   No chest pain, no shortness of breath, no abdominal pain       Objective:     Blood pressure (!) 82/62, pulse (!) 55, temperature 36.8 °C (98.2 °F), resp. rate 12, height 1.778 m (5' 10\"), weight 65.3 kg (144 lb), SpO2 98 %. Body mass index is 20.66 kg/m².   Physical Exam:  Alert, oriented in no acute distress.  Eye contact is good, speech goal directed, affect calm  HEENT: conjunctiva non-injected, sclera non-icteric, PERRL.  Pinna normal. TM pearly gray.   Oral mucous membranes pink and moist with no lesions.  Neck No adenopathy or masses in the neck or supraclavicular regions.  Lungs: clear to auscultation bilaterally with good excursion.  CV: regular rate and rhythm.  Abdomen: soft, nontender, no HSM, No CVAT  Ext: no edema, color normal, peripheral pulses 2+, temperature normal        Assessment and Plan:   The following treatment plan was discussed     1. Preventative health care  PAP- 2016 with annual exam by Dr. Rodgers.   mammo- 5/15/18  TDap- 8/8/14  Colonoscopy- 1/9/15    2. Hypothyroidism, unspecified type  Stable, continue current treatment  - levothyroxine (SYNTHROID) 100 MCG Tab; Take 1 Tab by mouth every day. In morning one hour prior to any food or drink.  Dispense: 90 Tab; Refill: 3    3. Generalized abdominal pain  Improved, she will continue with changing diet. She will be starting an elimination diet. Stated didn't want any further labs or images at this time since getting better with diet changes.   - REFERRAL TO GASTROENTEROLOGY    4. Diarrhea, unspecified type  Same as #3. "   - REFERRAL TO GASTROENTEROLOGY    5. Carotid occlusion, left  No treatment needed, has been released by vascular surgeon and cardiologists.     6. Family history of nonmelanoma skin cancer  Continue to follow derm    7. S/P hysterectomy  Stable, continue to follow OBGYN    8. Chronic bilateral low back pain without sciatica  None present today, discussed continue with back stretches and refill given since she is so active during summer months.   - metaxalone (SKELAXIN) 800 MG Tab; Take 1 Tab by mouth 3 times a day.  Dispense: 60 Tab; Refill: 0      Followup: Return in about 6 months (around 12/11/2018) for chronic conditions.           Please note that this dictation was created using voice recognition software. I have made every reasonable attempt to correct obvious errors, but I expect that there are errors of grammar and possibly content that I did not discover before finalizing the note.

## 2018-06-11 NOTE — ASSESSMENT & PLAN NOTE
Abdominal pain improved.   Still mild in epigastric region but no further in mid abdomin and no further diarrhea or BRBPR after running.   She stopped drinking whole milk, switched to coconut and almond.   Also stopped soft ice cream which seemed to help the most.   She thinks back to when she started garlic and will be switching this soon.   Has planned to start elimination diet to see if there is anything else that could be causing her intermittent diarrhea and abdominal discomfort.  She has seen gastroenterology in the past but would like referral just in case this is needed if symptoms return.   Today denies any abdominal discomfort, nausea, vomiting, diarrhea, constipation, blood in stool.

## 2018-06-11 NOTE — ASSESSMENT & PLAN NOTE
Fhx- Sister and father- non melanoma skin cancer  Followed by Dr. Nugent annually.   Also use to pick at skin and has scaring on arms bilaterally.  This is no longer an issue.

## 2018-06-13 ENCOUNTER — TELEPHONE (OUTPATIENT)
Dept: HEMATOLOGY ONCOLOGY | Facility: MEDICAL CENTER | Age: 58
End: 2018-06-13

## 2018-06-13 NOTE — TELEPHONE ENCOUNTER
1st attempt to contact the patient.  LM on voicemail for patient requesting a call back to schedule a new patient hematology appointment.  NP/HTH/ Cas/ Irma Lundberg

## 2018-08-17 ENCOUNTER — OFFICE VISIT (OUTPATIENT)
Dept: HEMATOLOGY ONCOLOGY | Facility: MEDICAL CENTER | Age: 58
End: 2018-08-17
Payer: COMMERCIAL

## 2018-08-17 VITALS
DIASTOLIC BLOOD PRESSURE: 60 MMHG | HEART RATE: 56 BPM | SYSTOLIC BLOOD PRESSURE: 100 MMHG | WEIGHT: 142.09 LBS | RESPIRATION RATE: 18 BRPM | OXYGEN SATURATION: 97 % | BODY MASS INDEX: 19.89 KG/M2 | HEIGHT: 71 IN | TEMPERATURE: 97.5 F

## 2018-08-17 DIAGNOSIS — D69.6 THROMBOCYTOPENIA (HCC): ICD-10-CM

## 2018-08-17 DIAGNOSIS — R23.3 EASY BRUISING: ICD-10-CM

## 2018-08-17 PROCEDURE — 99204 OFFICE O/P NEW MOD 45 MIN: CPT | Performed by: INTERNAL MEDICINE

## 2018-08-17 ASSESSMENT — PAIN SCALES - GENERAL: PAINLEVEL: NO PAIN

## 2018-08-17 NOTE — PROGRESS NOTES
"Consult Note: Hematology    Date of consultation: 8/17/2018     Referring provider: Irma Lundberg P.A.-C.    Reason for consultation:   CC: Thrombocytopenia    History of presenting illness:   First seen in on 8/17/2018  Lianne Abreu  is a 58 y.o. year old female seen in clinic today for evaluation of easy bruising and thrombocytopenia.  Patient states he is extremely active man outdoorsy.  She runs or bikes between 50-60 miles a week.  She has occasionally noticed bruising on the palms of her hands and dorsal surface of her feet.  The patient is seen to have mild chronic thrombocytopenia for at least a couple of years and has been referred by her primary care physician for further evaluation and treatment.      Review of Systems:  Constitutional: No fever, chills, weight loss ,malaise/fatigue.      All other review of systems are negative except what was mentioned above in the HPI.    Past Medical History:    Past Medical History:   Diagnosis Date   • Anesthesia     sister had \"seizures after surgery\", due to low sodium?   • Other specified symptom associated with female genital organs    • Raynaud's syndrome    • Unspecified disorder of thyroid    • Unspecified hemorrhagic conditions 01/2015    vaginal bleeding       Past surgical history:    Past Surgical History:   Procedure Laterality Date   • VAGINAL HYSTERECTOMY SCOPE TOTAL  1/30/2015    Performed by Db Rodgers M.D. at SURGERY SAME DAY [facility] ORS   • PRIMARY C SECTION     • TONSILLECTOMY     • TUBAL COAGULATION LAPAROSCOPIC BILATERAL         Allergies:  Patient has no known allergies.    Medications:    Current Outpatient Prescriptions   Medication Sig Dispense Refill   • levothyroxine (SYNTHROID) 100 MCG Tab Take 1 Tab by mouth every day. In morning one hour prior to any food or drink. 90 Tab 3   • Clindamycin Phosphate 1 % Foam 1 g by Apply externally route 2 times a day as needed. 50 g 2   • metaxalone (SKELAXIN) 800 MG Tab Take 1 Tab by " "mouth 3 times a day. 60 Tab 0   • clobetasol (TEMOVATE) 0.05 % Cream Apply 1 g to affected area(s) 2 times a day. Apply to affected skin BID for up to 2 weeks. DO NOT use on FACE 45 g 0   • ibuprofen (MOTRIN) 600 MG Tab Take 1 Tab by mouth every 6 hours as needed. 90 Tab 1   • estradiol (MINIVELLE) 0.0375 MG/24HR patch 1 path BIW 12 Tab 3   • Cholecalciferol (VITAMIN D) 2000 UNITS CAPS Take  by mouth every day.     • CALCIUM-MAGNESIUM-ZINC PO Take  by mouth every day.       No current facility-administered medications for this visit.        Social History:     Social History     Social History   • Marital status:      Spouse name: N/A   • Number of children: N/A   • Years of education: N/A     Occupational History   • Teacher [employer]     Social History Main Topics   • Smoking status: Never Smoker   • Smokeless tobacco: Never Used   • Alcohol use No   • Drug use: No   • Sexual activity: Not Currently     Partners: Male     Other Topics Concern   • Not on file     Social History Narrative   • No narrative on file       Family History:     Family History   Problem Relation Age of Onset   • Alzheimer's Disease Mother    • Thyroid Father    • Heart Disease Father 40        MI   • Colon Cancer Father    • Cancer Sister         ovarian ca   • Thyroid Sister    • Cancer Sister         SCCA   • Thyroid Sister    • Stroke Neg Hx    • Diabetes Neg Hx        Physical Exam:  Vitals:   /60   Pulse (!) 56   Temp 36.4 °C (97.5 °F)   Resp 18   Ht 1.803 m (5' 11\")   Wt 64.4 kg (142 lb 1.4 oz)   SpO2 97%   BMI 19.82 kg/m²     General: Not in acute distress,   HEENT: No pallor, icterus. Oropharynx clear.   Neck: Supple, no palpable masses.  Lymph nodes: No palpable cervical, supraclavicular, axillary or inguinal lymphadenopathy.    CVS: regular rate and rhythm, no rubs or gallops  RESP: Clear to auscultate bilaterally, no wheezing or crackles.   ABD: Soft, non tender, non distended, positive bowel " sounds, no palpable organomegaly  EXT: No edema or cyanosis  CNS: Alert and oriented x3, No focal deficits.  Skin-she has numerous depigmented spots on her arms bilaterally.  Patient states she gets frequent skin infections which he will with depigmentation.      Labs:   Is notResults for TANI LEE (MRN [id number]) as of 8/17/2018 09:20   1/30/2015 14:02 2/12/2016 09:26 6/3/2016 08:25 5/27/2017 11:12 5/23/2018 16:45   WBC 15.1 (H) 4.2 (L) 4.7 (L) 4.9 6.0   RBC 3.78 (L) 4.34 4.45 4.54 4.23   Hemoglobin 12.8 13.8 14.3 14.7 13.5   Hematocrit 36.6 (L) 41.8 42.7 42.7 40.6   MCV 96.8 96.3 96.0 94.1 96.0   MCH 33.9 (H) 31.8 32.1 32.4 31.9   MCHC 35.0 33.0 (L) 33.5 (L) 34.4 33.3 (L)   RDW 39.4 42.2 41.0 41.3 43.7   Platelet Count 146 (L) 143 (L) 154 (L) 158 (L) 149 (L)   MPV 11.1 11.2 11.6 11.7 11.7   Neutrophils-Polys  56.80 59.50 63.00 62.10   Neutrophils (Absolute)  2.41 2.82 3.11 3.75   Lymphocytes  26.70 23.50 23.30 24.30   Lymphs (Absolute)  1.13 1.11 1.15 1.47   Monocytes  12.00 12.30 10.30 9.80   Monos (Absolute)  0.51 0.58 0.51 0.59   Eosinophils  2.60 3.00 1.60 2.60   Eos (Absolute)  0.11 0.14 0.08 0.16   Basophils  1.70 1.30 1.60 1.00   Baso (Absolute)  0.07 0.06 0.08 0.06   Immature Granulocytes  0.20 0.40 0.20 0.20   Immature Granulocytes (abs)  0.01 0.02 0.01 0.01   Nucleated RBC  0.00 0.00 0.00 0.00   NRBC (Absolute)  0.00 0.00 0.00 0.00       Imaging:    Assessment and Plan:  -Thrombocytopenia  -Chronic, stable  -Has a sister with ITP who needed splenectomy-  -Check CBC today with manual differential  -we will check an ultrasound of the liver and spleen    -Easy bruising  -Check coags  -If normal will check for von Willebrand disease      She agreed and verbalized her agreement and understanding with the current plan.  I answered all questions and concerns she has at this time.  Dear  , Irma Greco, P.A.-YANCY.,  Thank you for allowing me to participate in her care.    All labs and/or imaging studies  mentioned in the note above were reviewed with and explained to the patient as a pertain to medical decision making.    Please note that this dictation was created using voice recognition software. I have made every reasonable attempt to correct obvious errors, but I expect that there are errors of grammar and possibly content that I did not discover before finalizing the note.      SIGNATURES:  Maryan Blanchard    CC:  NATHAN Monsalve Karie Ann, P.A.-C.

## 2018-08-24 ENCOUNTER — HOSPITAL ENCOUNTER (OUTPATIENT)
Dept: LAB | Facility: MEDICAL CENTER | Age: 58
End: 2018-08-24
Attending: INTERNAL MEDICINE
Payer: COMMERCIAL

## 2018-08-24 DIAGNOSIS — D69.6 THROMBOCYTOPENIA (HCC): ICD-10-CM

## 2018-08-24 DIAGNOSIS — R23.3 EASY BRUISING: ICD-10-CM

## 2018-08-24 LAB
APTT PPP: 32.4 SEC (ref 24.7–36)
BASOPHILS # BLD AUTO: 1.8 % (ref 0–1.8)
BASOPHILS # BLD: 0.12 K/UL (ref 0–0.12)
EOSINOPHIL # BLD AUTO: 0.23 K/UL (ref 0–0.51)
EOSINOPHIL NFR BLD: 3.5 % (ref 0–6.9)
ERYTHROCYTE [DISTWIDTH] IN BLOOD BY AUTOMATED COUNT: 43.5 FL (ref 35.9–50)
HCT VFR BLD AUTO: 44.8 % (ref 37–47)
HGB BLD-MCNC: 14.9 G/DL (ref 12–16)
INR PPP: 1.11 (ref 0.87–1.13)
LYMPHOCYTES # BLD AUTO: 2.25 K/UL (ref 1–4.8)
LYMPHOCYTES NFR BLD: 33.6 % (ref 22–41)
MANUAL DIFF BLD: NORMAL
MCH RBC QN AUTO: 32 PG (ref 27–33)
MCHC RBC AUTO-ENTMCNC: 33.3 G/DL (ref 33.6–35)
MCV RBC AUTO: 96.3 FL (ref 81.4–97.8)
MONOCYTES # BLD AUTO: 0.42 K/UL (ref 0–0.85)
MONOCYTES NFR BLD AUTO: 6.2 % (ref 0–13.4)
NEUTROPHILS # BLD AUTO: 3.62 K/UL (ref 2–7.15)
NEUTROPHILS NFR BLD: 54 % (ref 44–72)
NEUTS BAND NFR BLD MANUAL: 0.9 % (ref 0–10)
PLATELET # BLD AUTO: 141 K/UL (ref 164–446)
PMV BLD AUTO: 12 FL (ref 9–12.9)
PROTHROMBIN TIME: 14 SEC (ref 12–14.6)
RBC # BLD AUTO: 4.65 M/UL (ref 4.2–5.4)
WBC # BLD AUTO: 6.7 K/UL (ref 4.8–10.8)

## 2018-08-24 PROCEDURE — 85007 BL SMEAR W/DIFF WBC COUNT: CPT

## 2018-08-24 PROCEDURE — 85027 COMPLETE CBC AUTOMATED: CPT

## 2018-08-24 PROCEDURE — 85610 PROTHROMBIN TIME: CPT

## 2018-08-24 PROCEDURE — 36415 COLL VENOUS BLD VENIPUNCTURE: CPT

## 2018-08-24 PROCEDURE — 85730 THROMBOPLASTIN TIME PARTIAL: CPT

## 2018-09-07 ENCOUNTER — HOSPITAL ENCOUNTER (OUTPATIENT)
Dept: RADIOLOGY | Facility: MEDICAL CENTER | Age: 58
End: 2018-09-07
Attending: INTERNAL MEDICINE
Payer: COMMERCIAL

## 2018-09-07 DIAGNOSIS — D69.6 THROMBOCYTOPENIA (HCC): ICD-10-CM

## 2018-09-07 DIAGNOSIS — R23.3 EASY BRUISING: ICD-10-CM

## 2018-09-14 ENCOUNTER — HOSPITAL ENCOUNTER (OUTPATIENT)
Dept: RADIOLOGY | Facility: MEDICAL CENTER | Age: 58
End: 2018-09-14
Attending: INTERNAL MEDICINE
Payer: COMMERCIAL

## 2018-09-14 PROCEDURE — 76700 US EXAM ABDOM COMPLETE: CPT

## 2018-09-20 ENCOUNTER — OFFICE VISIT (OUTPATIENT)
Dept: MEDICAL GROUP | Facility: LAB | Age: 58
End: 2018-09-20
Payer: COMMERCIAL

## 2018-09-20 VITALS
BODY MASS INDEX: 19.88 KG/M2 | HEIGHT: 71 IN | TEMPERATURE: 97 F | DIASTOLIC BLOOD PRESSURE: 64 MMHG | HEART RATE: 66 BPM | SYSTOLIC BLOOD PRESSURE: 110 MMHG | OXYGEN SATURATION: 97 % | WEIGHT: 142 LBS | RESPIRATION RATE: 12 BRPM

## 2018-09-20 DIAGNOSIS — D69.6 THROMBOCYTOPENIA (HCC): ICD-10-CM

## 2018-09-20 DIAGNOSIS — K52.9 CHRONIC DIARRHEA: ICD-10-CM

## 2018-09-20 DIAGNOSIS — I65.22 CAROTID OCCLUSION, LEFT: ICD-10-CM

## 2018-09-20 DIAGNOSIS — R23.3 EASY BRUISING: ICD-10-CM

## 2018-09-20 DIAGNOSIS — E03.9 HYPOTHYROIDISM, UNSPECIFIED TYPE: ICD-10-CM

## 2018-09-20 PROCEDURE — 99214 OFFICE O/P EST MOD 30 MIN: CPT | Performed by: FAMILY MEDICINE

## 2018-09-20 NOTE — PROGRESS NOTES
"Lianne Abreu is a 58 y.o. female here for diarrhea, est care    HPI:  Lianne is a very pleasant 58 y.o. female.  She is here today to establish care.  Previous PCP Irma Lundberg PA-C    1. Chronic diarrhea  New to discuss with me   Patient reports chronic diarrhea after returning from [facility] last year.  She was having blood in the stool.  The blood in the stool with diarrhea would generally happen after intense exercise like running.  She is also having abdominal cramping.  She did see gastroenterology.  They empirically treated with metronidazole.  She did have improvement.  She went on a run last week and she did have some mild diarrhea after her run but no blood in the stool and no abdominal cramping    2. Thrombocytopenia (HCC)  3. Easy bruising  New to discuss with me.  Patient has had a long history of thrombocytopenia.  She has noticed increased bruising and rupture of spider veins in her feet and ankles with jogging.  This has been new for her over the last year.  She saw hematology who did more labs in a manual differential.  An ultrasound of her spleen and liver was done which was unremarkable      1.  Small echogenic mass in right lobe of liver is consistent with cavernous hemangioma.  2.  Nonobstructing calcification lower pole left kidney    4. Carotid occlusion, left  New to discuss with me   Started in 2017 during a trip in [facility]  Noticed L eye shadowing and feeling as though a curtain was being pulled over the eye.  She was noticing numb patches on the face.  She noticed posterior headache and feeling like her head and face is going to \"explode\".  She has been on hormone replacement therapy controlled by her OB/GYN Dr. Joyce.  She has seen cardiology for this.  They recommended decreasing her estrogen supplementation    Current medicines (including changes today)  Current Outpatient Prescriptions   Medication Sig Dispense Refill   • levothyroxine (SYNTHROID) 100 MCG Tab Take 1 Tab by mouth " every day. In morning one hour prior to any food or drink. 90 Tab 3   • estradiol (MINIVELLE) 0.0375 MG/24HR patch 1 path BIW (Patient taking differently: Apply 1 Patch to skin as directed. 1 path BIW) 12 Tab 3   • Cholecalciferol (VITAMIN D) 2000 UNITS CAPS Take  by mouth every day.     • CALCIUM-MAGNESIUM-ZINC PO Take  by mouth every day.     • Clindamycin Phosphate 1 % Foam 1 g by Apply externally route 2 times a day as needed. 50 g 2   • metaxalone (SKELAXIN) 800 MG Tab Take 1 Tab by mouth 3 times a day. 60 Tab 0   • clobetasol (TEMOVATE) 0.05 % Cream Apply 1 g to affected area(s) 2 times a day. Apply to affected skin BID for up to 2 weeks. DO NOT use on FACE 45 g 0   • ibuprofen (MOTRIN) 600 MG Tab Take 1 Tab by mouth every 6 hours as needed. 90 Tab 1     No current facility-administered medications for this visit.      She  has a past medical history of Anesthesia; Other specified symptom associated with female genital organs; Raynaud's syndrome; Unspecified disorder of thyroid; and Unspecified hemorrhagic conditions (2015).  She  has a past surgical history that includes vaginal hysterectomy scope total (2015); tonsillectomy; primary c section; and tubal coagulation laparoscopic bilateral.  Social History   Substance Use Topics   • Smoking status: Never Smoker   • Smokeless tobacco: Never Used   • Alcohol use No     Social History     Social History Narrative   • No narrative on file     Family History   Problem Relation Age of Onset   • Alzheimer's Disease Mother    • Thyroid Father    • Heart Disease Father 40        MI   • Colon Cancer Father    • Cancer Sister         ovarian ca   • Thyroid Sister    • Cancer Sister         SCCA   • Thyroid Sister    • Stroke Neg Hx    • Diabetes Neg Hx      Family Status   Relation Status   • Mo Alive   • Fa    • Sis Alive   • Sis Alive   • Sis Alive   • Sis Alive   • Son Alive   • Son Alive   • Son Alive   • Arlene Alive   • Neg Hx (Not Specified)  "        ROS  Positive for diarrhea, bruising, varicose veins  All other systems reviewed and are negative     Objective:     Blood pressure 110/64, pulse 66, temperature 36.1 °C (97 °F), temperature source Temporal, resp. rate 12, height 1.803 m (5' 11\"), weight 64.4 kg (142 lb), SpO2 97 %, not currently breastfeeding. Body mass index is 19.8 kg/m².  Physical Exam:    Constitutional: Alert, no distress.  Skin: Warm, dry, good turgor, no rashes in visible areas.  She does have hypopigmentation and scarring of bilateral arms  Eye: Equal, round and reactive, conjunctiva clear, lids normal.  ENMT: Lips without lesions, good dentition, oropharynx clear. TM's pearly gray with normal light reflexes bilaterally  Neck: Trachea midline, no masses, no thyromegaly. No cervical or supraclavicular lymphadenopathy.  Respiratory: Unlabored respiratory effort, lungs clear to auscultation bilaterally, no wheezes, no ronchi.  Cardiovascular: Normal S1, S2, RRR, no murmur, no edema. L carotid bruit minimal   Abdomen: Soft, non-tender, no masses, no hepatosplenomegaly.  Psych: Alert and oriented x3, normal affect and mood.      Assessment and Plan:   The following treatment plan was discussed    1. Chronic diarrhea  Chronic, now improved after course of metronidazole.  She has seen GI for this  - COMP METABOLIC PANEL; Future  - CBC WITH DIFFERENTIAL; Future  - LIPID PROFILE; Future  - TSH; Future  - FREE THYROXINE; Future    2. Thrombocytopenia (HCC)  Chronic, stable and seeing hematology.  Ultrasound of liver and spleen normal  - COMP METABOLIC PANEL; Future  - CBC WITH DIFFERENTIAL; Future  - LIPID PROFILE; Future  - TSH; Future  - FREE THYROXINE; Future    3. Easy bruising  Chronic, stable and seeing hematology for thrombocytopenia.  Not on a baby aspirin  - COMP METABOLIC PANEL; Future  - CBC WITH DIFFERENTIAL; Future  - LIPID PROFILE; Future  - TSH; Future  - FREE THYROXINE; Future    4. Carotid occlusion, left  New to discuss with " me, chronic for the patient.  Has seen cardiology in the past.  Was recommended to decrease hormone replacement therapy.  Was not recommended to start aspirin or statin by her report.  She was cleared from their practice.  She is still on hormone replacement therapy.  No further symptoms.  - COMP METABOLIC PANEL; Future  - CBC WITH DIFFERENTIAL; Future  - LIPID PROFILE; Future  - TSH; Future  - FREE THYROXINE; Future    5. Hypothyroidism, unspecified type  Chronic, stable, recheck labs in June  - COMP METABOLIC PANEL; Future  - CBC WITH DIFFERENTIAL; Future  - LIPID PROFILE; Future  - TSH; Future  - FREE THYROXINE; Future      Records requested.  Followup: Return in about 9 months (around 6/20/2019) for Annual.         This note was created using voice recognition software. I have made every reasonable attempt to correct errors, however, I do anticipate some grammatical errors.

## 2018-09-28 ENCOUNTER — OFFICE VISIT (OUTPATIENT)
Dept: HEMATOLOGY ONCOLOGY | Facility: MEDICAL CENTER | Age: 58
End: 2018-09-28
Payer: COMMERCIAL

## 2018-09-28 VITALS
RESPIRATION RATE: 16 BRPM | WEIGHT: 144.84 LBS | DIASTOLIC BLOOD PRESSURE: 60 MMHG | HEIGHT: 71 IN | SYSTOLIC BLOOD PRESSURE: 96 MMHG | HEART RATE: 63 BPM | OXYGEN SATURATION: 95 % | BODY MASS INDEX: 20.28 KG/M2 | TEMPERATURE: 98.6 F

## 2018-09-28 DIAGNOSIS — D69.6 THROMBOCYTOPENIA (HCC): ICD-10-CM

## 2018-09-28 PROCEDURE — 99213 OFFICE O/P EST LOW 20 MIN: CPT | Performed by: INTERNAL MEDICINE

## 2018-09-28 ASSESSMENT — PAIN SCALES - GENERAL: PAINLEVEL: NO PAIN

## 2018-10-02 NOTE — PROGRESS NOTES
"Date of visit: 9/27/2018  7:56 AM      Chief Complaint- bruising, mild thrombocytopenia      Identification/Prior relevant history: Lianne Abreu  is a 58 y.o. year old female seen by Dr Blanchard for bruising involving her feet, especially during hiking and mild thrombocytopenia. She is otherwise asymptomatic.   Interim history-her workup including coagulation parameters were unremarkable. Platelet count was only mildly low at 140 range. She denies having any acute complaints. She had chronic diarrhea problems which improved with antibiotics.    Past Medical History:      Past Medical History:   Diagnosis Date   • Anesthesia     sister had \"seizures after surgery\", due to low sodium?   • Other specified symptom associated with female genital organs    • Raynaud's syndrome    • Unspecified disorder of thyroid    • Unspecified hemorrhagic conditions 01/2015    vaginal bleeding       Past surgical history:       Past Surgical History:   Procedure Laterality Date   • VAGINAL HYSTERECTOMY SCOPE TOTAL  1/30/2015    Performed by Db Rodgers M.D. at [facility]   • PRIMARY C SECTION     • TONSILLECTOMY     • TUBAL COAGULATION LAPAROSCOPIC BILATERAL         Allergies:       Patient has no known allergies.    Medications:         Current Outpatient Prescriptions   Medication Sig Dispense Refill   • Clindamycin Phosphate 1 % Foam 1 g by Apply externally route 2 times a day as needed. 50 g 2   • levothyroxine (SYNTHROID) 100 MCG Tab Take 1 Tab by mouth every day. In morning one hour prior to any food or drink. 90 Tab 3   • metaxalone (SKELAXIN) 800 MG Tab Take 1 Tab by mouth 3 times a day. 60 Tab 0   • clobetasol (TEMOVATE) 0.05 % Cream Apply 1 g to affected area(s) 2 times a day. Apply to affected skin BID for up to 2 weeks. DO NOT use on FACE 45 g 0   • ibuprofen (MOTRIN) 600 MG Tab Take 1 Tab by mouth every 6 hours as needed. 90 Tab 1   • estradiol (MINIVELLE) 0.0375 MG/24HR patch 1 path BIW (Patient " taking differently: Apply 1 Patch to skin as directed. 1 path BIW) 12 Tab 3   • Cholecalciferol (VITAMIN D) 2000 UNITS CAPS Take  by mouth every day.     • CALCIUM-MAGNESIUM-ZINC PO Take  by mouth every day.       No current facility-administered medications for this visit.          Social History:     Social History     Social History   • Marital status:      Spouse name: N/A   • Number of children: N/A   • Years of education: N/A     Occupational History   • Teacher [employer]     Social History Main Topics   • Smoking status: Never Smoker   • Smokeless tobacco: Never Used   • Alcohol use No   • Drug use: No   • Sexual activity: Not Currently     Partners: Male     Other Topics Concern   • Not on file     Social History Narrative   • No narrative on file       Family History:      Family History   Problem Relation Age of Onset   • Alzheimer's Disease Mother    • Thyroid Father    • Heart Disease Father 40        MI   • Colon Cancer Father    • Cancer Sister         ovarian ca   • Thyroid Sister    • Cancer Sister         SCCA   • Thyroid Sister    • Stroke Neg Hx    • Diabetes Neg Hx        Review of Systems:  All other review of systems are negative except what was mentioned above in the HPI.    Constitutional: Negative for fever, chills, weight loss and malaise/fatigue.    HEENT: No new auditory or visual complaints. No sore throat and neck pain.     Respiratory: Negative for cough, sputum production, shortness of breath and wheezing.    Cardiovascular: Negative for chest pain, palpitations, orthopnea and leg swelling.    Gastrointestinal: Negative for heartburn, nausea, vomiting and abdominal pain.    Genitourinary: Negative for dysuria, hematuria    Musculoskeletal: No new arthralgias or myalgias   Skin: Negative for rash and itching.    Neurological: Negative for focal weakness and headaches.    Endo/Heme/Allergies: No abnormal bleed/bruise.    Psychiatric/Behavioral: No new  "depression/anxiety.    Physical Exam:  Vitals: BP (!) 96/60 (BP Location: Right arm, Patient Position: Sitting, BP Cuff Size: Small adult)   Pulse 63   Temp 37 °C (98.6 °F) (Temporal)   Resp 16   Ht 1.803 m (5' 11\")   Wt 65.7 kg (144 lb 13.5 oz)   SpO2 95%   BMI 20.20 kg/m²     General: Not in acute distress, alert and oriented x 3  HEENT: No pallor, icterus. Oropharynx clear.   Neck: Supple, no palpable masses.  Lymph nodes: No palpable cervical, supraclavicular, axillary or inguinal lymphadenopathy.    CVS: regular rate and rhythm, no rubs or gallops  RESP: Clear to auscultate bilaterally, no wheezing or crackles.   ABD: Soft, non tender, non distended, positive bowel sounds, no palpable organomegaly  EXT: No edema or cyanosis  CNS: Alert and oriented x3, No focal deficits.  Skin- No rash      Labs:   No visits with results within 1 Week(s) from this visit.   Latest known visit with results is:   Hospital Outpatient Visit on 08/24/2018   Component Date Value Ref Range Status   • WBC 08/24/2018 6.7  4.8 - 10.8 K/uL Final   • RBC 08/24/2018 4.65  4.20 - 5.40 M/uL Final   • Hemoglobin 08/24/2018 14.9  12.0 - 16.0 g/dL Final   • Hematocrit 08/24/2018 44.8  37.0 - 47.0 % Final   • MCV 08/24/2018 96.3  81.4 - 97.8 fL Final   • MCH 08/24/2018 32.0  27.0 - 33.0 pg Final   • MCHC 08/24/2018 33.3* 33.6 - 35.0 g/dL Final   • RDW 08/24/2018 43.5  35.9 - 50.0 fL Final   • Platelet Count 08/24/2018 141* 164 - 446 K/uL Final   • MPV 08/24/2018 12.0  9.0 - 12.9 fL Final   • Neutrophils-Polys 08/24/2018 54.00  44.00 - 72.00 % Final   • Lymphocytes 08/24/2018 33.60  22.00 - 41.00 % Final   • Monocytes 08/24/2018 6.20  0.00 - 13.40 % Final   • Eosinophils 08/24/2018 3.50  0.00 - 6.90 % Final   • Basophils 08/24/2018 1.80  0.00 - 1.80 % Final   • Lymphs (Absolute) 08/24/2018 2.25  1.00 - 4.80 K/uL Final   • Monos (Absolute) 08/24/2018 0.42  0.00 - 0.85 K/uL Final   • Eos (Absolute) 08/24/2018 0.23  0.00 - 0.51 K/uL Final   • " Baso (Absolute) 08/24/2018 0.12  0.00 - 0.12 K/uL Final   • Bands-Stabs 08/24/2018 0.90  0.00 - 10.00 % Final   • Manual Diff Status 08/24/2018 PERFORMED   Final   • Neutrophils (Absolute) 08/24/2018 3.62  2.00 - 7.15 K/uL Final    Includes immature neutrophils, if present.   • PT 08/24/2018 14.0  12.0 - 14.6 sec Final   • INR 08/24/2018 1.11  0.87 - 1.13 Final    Comment: INR - Non-therapeutic Reference Range: 0.87-1.13  INR - Therapeutic Reference Range: 2.0-4.0     • APTT 08/24/2018 32.4  24.7 - 36.0 sec Final    Therapeutic Heparin Range: 63-96 seconds             Assessment and Plan:  Bruising involving the feet-this was provoked more by hiking and outdoor activities. No active bruising at this point. Platelet count and coagulation parameters are grossly unremarkable. Thrombocytopenia is only mild and does not explain any further symptoms. Ultrasound did not reveal any cirrhosis. No active bruising. We will check a B12 and platelet function along with a hepatitis screen. Informed the patient that if she has more bruising. We will screen her for von Willebrand. However, overall her current presentation is not very concerning. We will be glad to see her back if she develops more bruising episodes.  She agreed and verbalized  agreement and understanding with the current plan.  I answered all questions and concerns at this time         Please note that this dictation was created using voice recognition software. I have made every reasonable attempt to correct obvious errors, but I expect that there are errors of grammar and possibly content that I did not discover before finalizing the note.      SIGNATURES:  Surinder Duarte    CC:  Keyana Khan M.D.  No ref. provider found

## 2018-10-13 ENCOUNTER — HOSPITAL ENCOUNTER (OUTPATIENT)
Dept: LAB | Facility: MEDICAL CENTER | Age: 58
End: 2018-10-13
Attending: INTERNAL MEDICINE
Payer: COMMERCIAL

## 2018-10-13 DIAGNOSIS — D69.6 THROMBOCYTOPENIA (HCC): ICD-10-CM

## 2018-10-13 LAB
HAV IGM SERPL QL IA: NEGATIVE
HBV CORE IGM SER QL: NEGATIVE
HBV SURFACE AG SER QL: NEGATIVE
HCV AB SER QL: NEGATIVE
MEDICATIONS NOTED 1688: NORMAL
PLT FUNCTION COL/EPI  1661: 92 SEC (ref 83–170)
VIT B12 SERPL-MCNC: 738 PG/ML (ref 211–911)

## 2018-10-13 PROCEDURE — 36415 COLL VENOUS BLD VENIPUNCTURE: CPT

## 2018-10-13 PROCEDURE — 85576 BLOOD PLATELET AGGREGATION: CPT

## 2018-10-13 PROCEDURE — 82607 VITAMIN B-12: CPT

## 2018-10-13 PROCEDURE — 80074 ACUTE HEPATITIS PANEL: CPT

## 2019-01-17 ENCOUNTER — OFFICE VISIT (OUTPATIENT)
Dept: MEDICAL GROUP | Facility: LAB | Age: 59
End: 2019-01-17
Payer: COMMERCIAL

## 2019-01-17 VITALS
HEIGHT: 71 IN | WEIGHT: 144 LBS | BODY MASS INDEX: 20.16 KG/M2 | SYSTOLIC BLOOD PRESSURE: 100 MMHG | OXYGEN SATURATION: 99 % | DIASTOLIC BLOOD PRESSURE: 66 MMHG | TEMPERATURE: 98 F | HEART RATE: 67 BPM

## 2019-01-17 DIAGNOSIS — M40.209 KYPHOSIS, UNSPECIFIED KYPHOSIS TYPE, UNSPECIFIED SPINAL REGION: ICD-10-CM

## 2019-01-17 DIAGNOSIS — G89.29 CHRONIC PAIN OF RIGHT KNEE: ICD-10-CM

## 2019-01-17 DIAGNOSIS — M25.561 CHRONIC PAIN OF RIGHT KNEE: ICD-10-CM

## 2019-01-17 DIAGNOSIS — J01.00 ACUTE NON-RECURRENT MAXILLARY SINUSITIS: ICD-10-CM

## 2019-01-17 PROCEDURE — 99214 OFFICE O/P EST MOD 30 MIN: CPT | Performed by: FAMILY MEDICINE

## 2019-01-17 RX ORDER — AMOXICILLIN AND CLAVULANATE POTASSIUM 875; 125 MG/1; MG/1
1 TABLET, FILM COATED ORAL 2 TIMES DAILY
Qty: 14 TAB | Refills: 0 | Status: SHIPPED | OUTPATIENT
Start: 2019-01-17 | End: 2019-01-24

## 2019-01-17 ASSESSMENT — PATIENT HEALTH QUESTIONNAIRE - PHQ9: CLINICAL INTERPRETATION OF PHQ2 SCORE: 0

## 2019-01-17 NOTE — PROGRESS NOTES
Subjective:   Lianne Abreu is a 58 y.o. female here today for   Chief Complaint   Patient presents with   • Sinus Problem     x 5 days    • Headache   • Referral Needed     physcial therapy          1. Sinus pressure  New to discuss  Started having difficulty sleeping and headaches worse with lying down  Started having severe facial pressure and posterior eye pressure   Symptoms present for 5 days  She is having postnasal drip.  No significant rhinorrhea.  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  She has been taking over-the-counter medication without significant relief in symptoms.    2. Kyphosis, unspecified kyphosis type, unspecified spinal region  3. Chronic pain of right knee  These are new problems to discuss with me today.  She states that her sister has told her that she has some mild kyphosis.  She is also having chronic pain of her right knee.  She would like a physical therapist to take a look at this and work on this before it gets worse.  She does take ibuprofen as needed for pain.    Current medicines (including changes today)  Current Outpatient Prescriptions   Medication Sig Dispense Refill   • amoxicillin-clavulanate (AUGMENTIN) 875-125 MG Tab Take 1 Tab by mouth 2 times a day for 7 days. 14 Tab 0   • levothyroxine (SYNTHROID) 100 MCG Tab Take 1 Tab by mouth every day. In morning one hour prior to any food or drink. 90 Tab 3   • Cholecalciferol (VITAMIN D) 2000 UNITS CAPS Take  by mouth every day.     • CALCIUM-MAGNESIUM-ZINC PO Take  by mouth every day.     • Clindamycin Phosphate 1 % Foam 1 g by Apply externally route 2 times a day as needed. 50 g 2   • metaxalone (SKELAXIN) 800 MG Tab Take 1 Tab by mouth 3 times a day. 60 Tab 0   • clobetasol (TEMOVATE) 0.05 % Cream Apply 1 g to affected area(s) 2 times a day. Apply to affected skin BID for up to 2 weeks. DO NOT use on FACE 45 g 0   • ibuprofen (MOTRIN) 600 MG Tab Take 1 Tab by mouth every 6 hours as needed. 90 Tab 1     No current  "facility-administered medications for this visit.      She  has a past medical history of Anesthesia; Other specified symptom associated with female genital organs; Raynaud's syndrome; Unspecified disorder of thyroid; and Unspecified hemorrhagic conditions (01/2015).    ROS   No fevers  No bowel changes  No LE edema       Objective:     Blood pressure 100/66, pulse 67, temperature 36.7 °C (98 °F), temperature source Temporal, height 1.803 m (5' 11\"), weight 65.3 kg (144 lb), SpO2 99 %, not currently breastfeeding. Body mass index is 20.08 kg/m².   Physical Exam:  Constitutional: Alert, no distress.  Skin: Warm, dry, good turgor, no rashes in visible areas.  Eye: Equal, round and reactive, conjunctiva clear, lids normal.  ENMT: Lips without lesions, good dentition, oropharynx erythematous with postnasal drip, no exudate, tympanic membranes pearly gray bilaterally.  Neck: Trachea midline, no masses, no thyromegaly. No cervical or supraclavicular lymphadenopathy  Respiratory: Unlabored respiratory effort, lungs clear to auscultation, no wheezes, no ronchi.  Cardiovascular: Normal S1, S2, RRR, no murmur, no edema.  Abdomen: Soft, non-tender, no masses, no hepatosplenomegaly.  Psych: Alert and oriented x3, normal affect and mood.      Assessment and Plan:   The following treatment plan was discussed    1. Acute non-recurrent maxillary sinusitis  This is a new problem, uncontrolled.  Discussed likely viral nature of his infections.  Given her symptoms have only been present for 5 days, I have discussed with her over-the-counter remedies such as Flonase twice daily, sinus rinses, Mucinex.  I have printed her out a prescription for Augmentin to take if no improvement by day 10 or if any fevers or chills develop  - amoxicillin-clavulanate (AUGMENTIN) 875-125 MG Tab; Take 1 Tab by mouth 2 times a day for 7 days.  Dispense: 14 Tab; Refill: 0    2. Kyphosis, unspecified kyphosis type, unspecified spinal region  This is a new " problem, order for physical therapy placed  - REFERRAL TO PHYSICAL THERAPY Reason for Therapy: Eval/Treat/Report    3. Chronic pain of right knee  This is a new problem, order for physical therapy placed  - REFERRAL TO PHYSICAL THERAPY Reason for Therapy: Eval/Treat/Report    Followup: Return in about 6 months (around 7/17/2019), or if symptoms worsen or fail to improve, for Annual.       This note was created using voice recognition software. I have made every reasonable attempt to correct errors, however, I do anticipate some grammatical errors.

## 2019-02-18 ENCOUNTER — PHYSICAL THERAPY (OUTPATIENT)
Dept: PHYSICAL THERAPY | Facility: MEDICAL CENTER | Age: 59
End: 2019-02-18
Attending: FAMILY MEDICINE
Payer: COMMERCIAL

## 2019-02-18 DIAGNOSIS — M25.561 CHRONIC PAIN OF RIGHT KNEE: ICD-10-CM

## 2019-02-18 DIAGNOSIS — G89.29 CHRONIC PAIN OF RIGHT KNEE: ICD-10-CM

## 2019-02-18 DIAGNOSIS — M40.209 KYPHOSIS, UNSPECIFIED KYPHOSIS TYPE, UNSPECIFIED SPINAL REGION: ICD-10-CM

## 2019-02-18 PROCEDURE — 97162 PT EVAL MOD COMPLEX 30 MIN: CPT

## 2019-02-18 NOTE — OP THERAPY EVALUATION
"  Outpatient Physical Therapy  INITIAL EVALUATION    [facility] Outpatient Physical Therapy  [address]  [place] [address]  Phone:  [phone]  Fax:  [phone]    Date of Evaluation: 02/18/2019    Patient: Lianne Abreu  YOB: 1960  MRN: [id number]     Referring Provider: Keyana Khan M.D.  [address] [address]  [place] [address]   Referring Diagnosis Kyphosis, unspecified kyphosis type, unspecified spinal region [M40.209];Chronic pain of right knee [M25.561, G89.29]     Time Calculation  Start time: 0900  Stop time: 1000 Time Calculation (min): 60 minutes     Physical Therapy Occurrence Codes    Date of onset of impairment:  11/18/18   Date physical therapy care plan established or reviewed:  2/18/19   Date physical therapy treatment started:  2/18/19          Chief Complaint: Back Problem    Visit Diagnoses     ICD-10-CM   1. Kyphosis, unspecified kyphosis type, unspecified spinal region M40.209   2. Chronic pain of right knee M25.561    G89.29         Subjective  58 year old female c/o R hip and leg pain with riding motor bike She also feel like she has poor thorasic posture  Pt c/o pain R hip area down post R leg with motor bike riding and then will stay painful rest of the night  She denies parathesia or loss of strength  Pt is very active running swimming and biking    Past Medical History:   Diagnosis Date   • Anesthesia     sister had \"seizures after surgery\", due to low sodium?   • Other specified symptom associated with female genital organs    • Raynaud's syndrome    • Unspecified disorder of thyroid    • Unspecified hemorrhagic conditions 01/2015    vaginal bleeding     Past Surgical History:   Procedure Laterality Date   • VAGINAL HYSTERECTOMY SCOPE TOTAL  1/30/2015    Performed by Db Rodgers M.D. at [facility]   • PRIMARY C SECTION     • TONSILLECTOMY     • TUBAL COAGULATION LAPAROSCOPIC BILATERAL   "     Social History   Substance Use Topics   • Smoking status: Never Smoker   • Smokeless tobacco: Never Used   • Alcohol use No     Family and Occupational History     Social History   • Marital status:      Spouse name: N/A   • Number of children: N/A   • Years of education: N/A     Occupational History   • Teacher [employer]       Objective  L pelvis is elevated  Slight increase in thorasic kyphosis but this is minimal  R hip pain with end range flexion and adduction  Lumbar ROM is WNL all directions  Lumbar stabilisation is weak   5/5 muscle strength all groups  On palpation tender R ASIS  R gluteals L quadratus   Thorasic mobility is WNL  Hip ROM WNL bi lat   Exercises/Treatment  Time-based treatments/modalities:      HEP for stabilisation exc  Posture for thorasic spine  Gluteal stretches  L leg pull    Assessment, Response and Plan:   Prognosis: good    Goals:   Short Term Goals:   Correct alignment so posture is normal  Pt is Independent and able to demonstrate HEP  Short term goal time span:  1-2 weeks      Long Term Goals:    Pt to be able to ride motor bike without pain  Long term goal time span:  4-6 weeks    Plan:   Planned therapy interventions:  Manual Therapy (CPT 87620), Therapeutic Activities (CPT 69254) and Therapeutic Exercise (CPT 28574)  Frequency:  1x week  Duration in weeks:  6  Duration in visits:  6      Functional Limitations and Severity Modifiers      Current:     Goal:       Referring provider co-signature:  I have reviewed this plan of care and my co-signature certifies the need for services.  Certification Dates:   From 2 /18 /19     To 4/1 19    Physician Signature: ________________________________ Date: ______________

## 2019-03-18 ENCOUNTER — PHYSICAL THERAPY (OUTPATIENT)
Dept: PHYSICAL THERAPY | Facility: MEDICAL CENTER | Age: 59
End: 2019-03-18
Attending: FAMILY MEDICINE
Payer: COMMERCIAL

## 2019-03-18 DIAGNOSIS — M40.209 KYPHOSIS, UNSPECIFIED KYPHOSIS TYPE, UNSPECIFIED SPINAL REGION: ICD-10-CM

## 2019-03-18 PROCEDURE — 97110 THERAPEUTIC EXERCISES: CPT

## 2019-03-18 PROCEDURE — 97140 MANUAL THERAPY 1/> REGIONS: CPT

## 2019-03-18 NOTE — OP THERAPY DAILY TREATMENT
Outpatient Physical Therapy  DAILY TREATMENT     [facility] Outpatient Physical Therapy  [address]  [place] [address]  Phone:  [phone]  Fax:  [phone]    Date: 03/18/2019    Patient: Lianne Abreu  YOB: 1960  MRN: [id number]     Time Calculation  Start time: 0400  Stop time: 0430 Time Calculation (min): 30 minutes     Chief Complaint: No chief complaint on file.    Visit #: 2    SUBJECTIVE:  Feeling much better has been riding bike without pain    OBJECTIVE:  Current objective measures:     alignment looks good    Exercises/Treatment  Time-based treatments/modalities:   reviewed HEP  Added stabilisation exc's  STM with tennis ball for gluteals  Psoas stretch       ASSESSMENT:   Response to treatment:   Pt much improved    PLAN/RECOMMENDATIONS:   Plan for treatment one more visit if feeling good will D/C on HEP

## 2019-04-03 ENCOUNTER — PHYSICAL THERAPY (OUTPATIENT)
Dept: PHYSICAL THERAPY | Facility: MEDICAL CENTER | Age: 59
End: 2019-04-03
Attending: FAMILY MEDICINE
Payer: COMMERCIAL

## 2019-04-03 DIAGNOSIS — M40.209 KYPHOSIS, UNSPECIFIED KYPHOSIS TYPE, UNSPECIFIED SPINAL REGION: ICD-10-CM

## 2019-04-03 PROCEDURE — 97140 MANUAL THERAPY 1/> REGIONS: CPT

## 2019-04-03 PROCEDURE — 97110 THERAPEUTIC EXERCISES: CPT

## 2019-04-03 NOTE — OP THERAPY DAILY TREATMENT
Outpatient Physical Therapy  DAILY TREATMENT     [facility] Outpatient Physical Therapy  [address]  [place] [address]  Phone:  [phone]  Fax:  [phone]    Date: 04/03/2019    Patient: Lianne Abreu  YOB: 1960  MRN: [id number]     Time Calculation  Start time: 0830  Stop time: 0900 Time Calculation (min): 30 minutes     Chief Complaint: No chief complaint on file.    Visit #: 3    SUBJECTIVE:  Pt c/o only occasional mild twinges R hip     OBJECTIVE:  Current objective measures:     alignment looks good    Exercises/Treatment  Time-based treatments/modalities:  Manual therapy minutes (CPT 57529): 15 minutes  Therapeutic exercise minutes (CPT 89000): 15 minutes   reviewed HEP  STM R psoas  R hip mobs  P/A thorasic    ASSESSMENT:   Response to treatment:   Pt has made good improvement,understands HEP  PLAN/RECOMMENDATIONS:   Plan for treatment: discharge patient due to accomplished goals.

## 2019-04-03 NOTE — OP THERAPY DISCHARGE SUMMARY
Outpatient Physical Therapy  DISCHARGE SUMMARY NOTE      [facility] Outpatient Physical Therapy  [address]  [place] [address]  Phone:  [phone]  Fax:  [phone]    Date of Visit: 04/03/2019    Patient: Lianne Abreu  YOB: 1960  MRN: [id number]     Referring Provider: Keyana Khan M.D.  [id number]  [place] [address]   Referring Diagnosis Unspecified kyphosis, site unspecified [M40.209];Pain in right knee [M25.561]     Physical Therapy Occurrence Codes    Date of onset of impairment:  11/18/18   Date physical therapy care plan established or reviewed:  2/18/19   Date physical therapy treatment started:  2/18/19          Functional Limitations and Severity Modifiers      Goal:     Discharge:         Your patient is being discharged from Physical Therapy with the following comments:   · Goals met    Comments:  Lianne has made nice improvementt,alignment now looks normal.she has only occasional min pain R hip.  She has a comprehensive HEP and should continue to do well  Limitations Remaining:  Nil    Recommendations:  Continue with HEP    Jameson Mckeon, PT    Date: 4/3/2019

## 2019-05-14 ENCOUNTER — TELEPHONE (OUTPATIENT)
Dept: MEDICAL GROUP | Facility: LAB | Age: 59
End: 2019-05-14

## 2019-05-14 ENCOUNTER — APPOINTMENT (OUTPATIENT)
Dept: MEDICAL GROUP | Facility: LAB | Age: 59
End: 2019-05-14
Payer: COMMERCIAL

## 2019-05-14 DIAGNOSIS — Z80.8 FAMILY HISTORY OF MELANOMA: ICD-10-CM

## 2019-05-14 NOTE — TELEPHONE ENCOUNTER
1. Caller Name: Lianne Abreu       Call Back Number: [phone] (home)         Patient approves a detailed voicemail message: N\A    Called and LVM for patient, to call back for more info on her dermatologists referral. There is a referral already placed.

## 2019-05-14 NOTE — TELEPHONE ENCOUNTER
----- Message from Meera Shaw sent at 5/14/2019 11:10 AM PDT -----  Pt just needed a referral to dermatologist, both of her appt were cxl

## 2019-05-24 ENCOUNTER — HOSPITAL ENCOUNTER (OUTPATIENT)
Dept: RADIOLOGY | Facility: MEDICAL CENTER | Age: 59
End: 2019-05-24
Attending: FAMILY MEDICINE
Payer: COMMERCIAL

## 2019-05-24 DIAGNOSIS — Z12.39 SCREENING BREAST EXAMINATION: ICD-10-CM

## 2019-05-24 PROCEDURE — 77063 BREAST TOMOSYNTHESIS BI: CPT

## 2019-06-07 ENCOUNTER — HOSPITAL ENCOUNTER (OUTPATIENT)
Dept: LAB | Facility: MEDICAL CENTER | Age: 59
End: 2019-06-07
Attending: FAMILY MEDICINE
Payer: COMMERCIAL

## 2019-06-07 DIAGNOSIS — R23.3 EASY BRUISING: ICD-10-CM

## 2019-06-07 DIAGNOSIS — E03.9 HYPOTHYROIDISM, UNSPECIFIED TYPE: ICD-10-CM

## 2019-06-07 DIAGNOSIS — D69.6 THROMBOCYTOPENIA (HCC): ICD-10-CM

## 2019-06-07 DIAGNOSIS — I65.22 CAROTID OCCLUSION, LEFT: ICD-10-CM

## 2019-06-07 DIAGNOSIS — K52.9 CHRONIC DIARRHEA: ICD-10-CM

## 2019-06-07 LAB
ALBUMIN SERPL BCP-MCNC: 4.7 G/DL (ref 3.2–4.9)
ALBUMIN/GLOB SERPL: 2 G/DL
ALP SERPL-CCNC: 72 U/L (ref 30–99)
ALT SERPL-CCNC: 19 U/L (ref 2–50)
ANION GAP SERPL CALC-SCNC: 8 MMOL/L (ref 0–11.9)
AST SERPL-CCNC: 21 U/L (ref 12–45)
BASOPHILS # BLD AUTO: 1.9 % (ref 0–1.8)
BASOPHILS # BLD: 0.09 K/UL (ref 0–0.12)
BILIRUB SERPL-MCNC: 1 MG/DL (ref 0.1–1.5)
BUN SERPL-MCNC: 16 MG/DL (ref 8–22)
CALCIUM SERPL-MCNC: 9.7 MG/DL (ref 8.5–10.5)
CHLORIDE SERPL-SCNC: 102 MMOL/L (ref 96–112)
CHOLEST SERPL-MCNC: 153 MG/DL (ref 100–199)
CO2 SERPL-SCNC: 29 MMOL/L (ref 20–33)
CREAT SERPL-MCNC: 0.98 MG/DL (ref 0.5–1.4)
EOSINOPHIL # BLD AUTO: 0.15 K/UL (ref 0–0.51)
EOSINOPHIL NFR BLD: 3.2 % (ref 0–6.9)
ERYTHROCYTE [DISTWIDTH] IN BLOOD BY AUTOMATED COUNT: 43.1 FL (ref 35.9–50)
FASTING STATUS PATIENT QL REPORTED: NORMAL
GLOBULIN SER CALC-MCNC: 2.3 G/DL (ref 1.9–3.5)
GLUCOSE SERPL-MCNC: 85 MG/DL (ref 65–99)
HCT VFR BLD AUTO: 46.2 % (ref 37–47)
HDLC SERPL-MCNC: 69 MG/DL
HGB BLD-MCNC: 14.9 G/DL (ref 12–16)
IMM GRANULOCYTES # BLD AUTO: 0 K/UL (ref 0–0.11)
IMM GRANULOCYTES NFR BLD AUTO: 0 % (ref 0–0.9)
LDLC SERPL CALC-MCNC: 71 MG/DL
LYMPHOCYTES # BLD AUTO: 1.18 K/UL (ref 1–4.8)
LYMPHOCYTES NFR BLD: 25.3 % (ref 22–41)
MCH RBC QN AUTO: 31.7 PG (ref 27–33)
MCHC RBC AUTO-ENTMCNC: 32.3 G/DL (ref 33.6–35)
MCV RBC AUTO: 98.3 FL (ref 81.4–97.8)
MONOCYTES # BLD AUTO: 0.52 K/UL (ref 0–0.85)
MONOCYTES NFR BLD AUTO: 11.1 % (ref 0–13.4)
NEUTROPHILS # BLD AUTO: 2.73 K/UL (ref 2–7.15)
NEUTROPHILS NFR BLD: 58.5 % (ref 44–72)
NRBC # BLD AUTO: 0 K/UL
NRBC BLD-RTO: 0 /100 WBC
PLATELET # BLD AUTO: 164 K/UL (ref 164–446)
PMV BLD AUTO: 11.4 FL (ref 9–12.9)
POTASSIUM SERPL-SCNC: 4.1 MMOL/L (ref 3.6–5.5)
PROT SERPL-MCNC: 7 G/DL (ref 6–8.2)
RBC # BLD AUTO: 4.7 M/UL (ref 4.2–5.4)
SODIUM SERPL-SCNC: 139 MMOL/L (ref 135–145)
T4 FREE SERPL-MCNC: 0.9 NG/DL (ref 0.53–1.43)
TRIGL SERPL-MCNC: 65 MG/DL (ref 0–149)
TSH SERPL DL<=0.005 MIU/L-ACNC: 4.54 UIU/ML (ref 0.38–5.33)
WBC # BLD AUTO: 4.7 K/UL (ref 4.8–10.8)

## 2019-06-07 PROCEDURE — 80053 COMPREHEN METABOLIC PANEL: CPT

## 2019-06-07 PROCEDURE — 84443 ASSAY THYROID STIM HORMONE: CPT

## 2019-06-07 PROCEDURE — 36415 COLL VENOUS BLD VENIPUNCTURE: CPT

## 2019-06-07 PROCEDURE — 84439 ASSAY OF FREE THYROXINE: CPT

## 2019-06-07 PROCEDURE — 85025 COMPLETE CBC W/AUTO DIFF WBC: CPT

## 2019-06-07 PROCEDURE — 80061 LIPID PANEL: CPT

## 2019-06-11 ENCOUNTER — APPOINTMENT (RX ONLY)
Dept: URBAN - METROPOLITAN AREA CLINIC 4 | Facility: CLINIC | Age: 59
Setting detail: DERMATOLOGY
End: 2019-06-11

## 2019-06-11 DIAGNOSIS — D18.0 HEMANGIOMA: ICD-10-CM

## 2019-06-11 DIAGNOSIS — Z87.2 PERSONAL HISTORY OF DISEASES OF THE SKIN AND SUBCUTANEOUS TISSUE: ICD-10-CM

## 2019-06-11 DIAGNOSIS — L82.1 OTHER SEBORRHEIC KERATOSIS: ICD-10-CM

## 2019-06-11 DIAGNOSIS — D22 MELANOCYTIC NEVI: ICD-10-CM

## 2019-06-11 DIAGNOSIS — Z71.89 OTHER SPECIFIED COUNSELING: ICD-10-CM

## 2019-06-11 DIAGNOSIS — L81.4 OTHER MELANIN HYPERPIGMENTATION: ICD-10-CM

## 2019-06-11 PROBLEM — D22.61 MELANOCYTIC NEVI OF RIGHT UPPER LIMB, INCLUDING SHOULDER: Status: ACTIVE | Noted: 2019-06-11

## 2019-06-11 PROBLEM — D22.5 MELANOCYTIC NEVI OF TRUNK: Status: ACTIVE | Noted: 2019-06-11

## 2019-06-11 PROBLEM — D48.5 NEOPLASM OF UNCERTAIN BEHAVIOR OF SKIN: Status: ACTIVE | Noted: 2019-06-11

## 2019-06-11 PROBLEM — F42.4 EXCORIATION (SKIN-PICKING) DISORDER: Status: ACTIVE | Noted: 2019-06-11

## 2019-06-11 PROBLEM — D18.01 HEMANGIOMA OF SKIN AND SUBCUTANEOUS TISSUE: Status: ACTIVE | Noted: 2019-06-11

## 2019-06-11 PROBLEM — D22.62 MELANOCYTIC NEVI OF LEFT UPPER LIMB, INCLUDING SHOULDER: Status: ACTIVE | Noted: 2019-06-11

## 2019-06-11 PROBLEM — D22.71 MELANOCYTIC NEVI OF RIGHT LOWER LIMB, INCLUDING HIP: Status: ACTIVE | Noted: 2019-06-11

## 2019-06-11 PROBLEM — D22.72 MELANOCYTIC NEVI OF LEFT LOWER LIMB, INCLUDING HIP: Status: ACTIVE | Noted: 2019-06-11

## 2019-06-11 PROCEDURE — 99203 OFFICE O/P NEW LOW 30 MIN: CPT

## 2019-06-11 PROCEDURE — ? COUNSELING

## 2019-06-11 PROCEDURE — ? ADDITIONAL NOTES

## 2019-06-11 PROCEDURE — ? DIAGNOSIS COMMENT

## 2019-06-11 PROCEDURE — ? PRESCRIPTION

## 2019-06-11 PROCEDURE — ? MEDICATION COUNSELING

## 2019-06-11 RX ORDER — CLOBETASOL PROPIONATE 0.5 MG/G
CREAM TOPICAL
Qty: 1 | Refills: 5 | Status: ERX | COMMUNITY
Start: 2019-06-11

## 2019-06-11 RX ORDER — MUPIROCIN 20 MG/G
OINTMENT TOPICAL
Qty: 1 | Refills: 5 | Status: ERX | COMMUNITY
Start: 2019-06-11

## 2019-06-11 RX ADMIN — CLOBETASOL PROPIONATE: 0.5 CREAM TOPICAL at 00:00

## 2019-06-11 RX ADMIN — MUPIROCIN: 20 OINTMENT TOPICAL at 00:00

## 2019-06-11 ASSESSMENT — LOCATION SIMPLE DESCRIPTION DERM
LOCATION SIMPLE: LEFT FOREHEAD
LOCATION SIMPLE: LEFT THIGH
LOCATION SIMPLE: INFERIOR FOREHEAD
LOCATION SIMPLE: ABDOMEN
LOCATION SIMPLE: UPPER BACK
LOCATION SIMPLE: CHEST
LOCATION SIMPLE: LEFT UPPER BACK
LOCATION SIMPLE: RIGHT THIGH
LOCATION SIMPLE: RIGHT UPPER ARM
LOCATION SIMPLE: LEFT UPPER ARM

## 2019-06-11 ASSESSMENT — LOCATION DETAILED DESCRIPTION DERM
LOCATION DETAILED: LEFT MEDIAL UPPER BACK
LOCATION DETAILED: INFERIOR THORACIC SPINE
LOCATION DETAILED: MIDDLE STERNUM
LOCATION DETAILED: RIGHT ANTERIOR DISTAL THIGH
LOCATION DETAILED: LOWER STERNUM
LOCATION DETAILED: UPPER STERNUM
LOCATION DETAILED: LEFT INFERIOR MEDIAL FOREHEAD
LOCATION DETAILED: LEFT MEDIAL FOREHEAD
LOCATION DETAILED: LEFT SUPERIOR UPPER BACK
LOCATION DETAILED: LEFT ANTERIOR DISTAL UPPER ARM
LOCATION DETAILED: EPIGASTRIC SKIN
LOCATION DETAILED: PERIUMBILICAL SKIN
LOCATION DETAILED: RIGHT ANTERIOR DISTAL UPPER ARM
LOCATION DETAILED: LEFT ANTERIOR PROXIMAL THIGH
LOCATION DETAILED: RIGHT ANTERIOR PROXIMAL THIGH
LOCATION DETAILED: LEFT SUPERIOR MEDIAL UPPER BACK
LOCATION DETAILED: LEFT ANTERIOR DISTAL THIGH
LOCATION DETAILED: INFERIOR MID FOREHEAD
LOCATION DETAILED: SUPERIOR THORACIC SPINE

## 2019-06-11 ASSESSMENT — LOCATION ZONE DERM
LOCATION ZONE: FACE
LOCATION ZONE: TRUNK
LOCATION ZONE: ARM
LOCATION ZONE: LEG

## 2019-06-11 NOTE — PROCEDURE: MEDICATION COUNSELING
Sski Pregnancy And Lactation Text: This medication is Pregnancy Category D and isn't considered safe during pregnancy. It is excreted in breast milk.
Benzoyl Peroxide Counseling: Patient counseled that medicine may cause skin irritation and bleach clothing.  In the event of skin irritation, the patient was advised to reduce the amount of the drug applied or use it less frequently.   The patient verbalized understanding of the proper use and possible adverse effects of benzoyl peroxide.  All of the patient's questions and concerns were addressed.
Bactrim Counseling:  I discussed with the patient the risks of sulfa antibiotics including but not limited to GI upset, allergic reaction, drug rash, diarrhea, dizziness, photosensitivity, and yeast infections.  Rarely, more serious reactions can occur including but not limited to aplastic anemia, agranulocytosis, methemoglobinemia, blood dyscrasias, liver or kidney failure, lung infiltrates or desquamative/blistering drug rashes.
Ilumya Pregnancy And Lactation Text: The risk during pregnancy and breastfeeding is uncertain with this medication.
Tetracycline Pregnancy And Lactation Text: This medication is Pregnancy Category D and not consider safe during pregnancy. It is also excreted in breast milk.
Cyclophosphamide Counseling:  I discussed with the patient the risks of cyclophosphamide including but not limited to hair loss, hormonal abnormalities, decreased fertility, abdominal pain, diarrhea, nausea and vomiting, bone marrow suppression and infection. The patient understands that monitoring is required while taking this medication.
Mirvaso Counseling: Mirvaso is a topical medication which can decrease superficial blood flow where applied. Side effects are uncommon and include stinging, redness and allergic reactions.
Glycopyrrolate Counseling:  I discussed with the patient the risks of glycopyrrolate including but not limited to skin rash, drowsiness, dry mouth, difficulty urinating, and blurred vision.
Wartpeel Pregnancy And Lactation Text: This medication is Pregnancy Category X and contraindicated in pregnancy and in women who may become pregnant. It is unknown if this medication is excreted in breast milk.
Cyclophosphamide Pregnancy And Lactation Text: This medication is Pregnancy Category D and it isn't considered safe during pregnancy. This medication is excreted in breast milk.
Picato Counseling:  I discussed with the patient the risks of Picato including but not limited to erythema, scaling, itching, weeping, crusting, and pain.
Benzoyl Peroxide Pregnancy And Lactation Text: This medication is Pregnancy Category C. It is unknown if benzoyl peroxide is excreted in breast milk.
Zyclara Counseling:  I discussed with the patient the risks of imiquimod including but not limited to erythema, scaling, itching, weeping, crusting, and pain.  Patient understands that the inflammatory response to imiquimod is variable from person to person and was educated regarded proper titration schedule.  If flu-like symptoms develop, patient knows to discontinue the medication and contact us.
Infliximab Counseling:  I discussed with the patient the risks of infliximab including but not limited to myelosuppression, immunosuppression, autoimmune hepatitis, demyelinating diseases, lymphoma, and serious infections.  The patient understands that monitoring is required including a PPD at baseline and must alert us or the primary physician if symptoms of infection or other concerning signs are noted.
Mirvaso Pregnancy And Lactation Text: This medication has not been assigned a Pregnancy Risk Category. It is unknown if the medication is excreted in breast milk.
Bactrim Pregnancy And Lactation Text: This medication is Pregnancy Category D and is known to cause fetal risk.  It is also excreted in breast milk.
Thalidomide Counseling: I discussed with the patient the risks of thalidomide including but not limited to birth defects, anxiety, weakness, chest pain, dizziness, cough and severe allergy.
Albendazole Counseling:  I discussed with the patient the risks of albendazole including but not limited to cytopenia, kidney damage, nausea/vomiting and severe allergy.  The patient understands that this medication is being used in an off-label manner.
Glycopyrrolate Pregnancy And Lactation Text: This medication is Pregnancy Category B and is considered safe during pregnancy. It is unknown if it is excreted breast milk.
Picato Pregnancy And Lactation Text: This medication is Pregnancy Category C. It is unknown if this medication is excreted in breast milk.
Ivermectin Counseling:  Patient instructed to take medication on an empty stomach with a full glass of water.  Patient informed of potential adverse effects including but not limited to nausea, diarrhea, dizziness, itching, and swelling of the extremities or lymph nodes.  The patient verbalized understanding of the proper use and possible adverse effects of ivermectin.  All of the patient's questions and concerns were addressed.
Carac Counseling:  I discussed with the patient the risks of Carac including but not limited to erythema, scaling, itching, weeping, crusting, and pain.
Hydroxychloroquine Pregnancy And Lactation Text: This medication has been shown to cause fetal harm but it isn't assigned a Pregnancy Risk Category. There are small amounts excreted in breast milk.
Hydroxychloroquine Counseling:  I discussed with the patient that a baseline ophthalmologic exam is needed at the start of therapy and every year thereafter while on therapy. A CBC may also be warranted for monitoring.  The side effects of this medication were discussed with the patient, including but not limited to agranulocytosis, aplastic anemia, seizures, rashes, retinopathy, and liver toxicity. Patient instructed to call the office should any adverse effect occur.  The patient verbalized understanding of the proper use and possible adverse effects of Plaquenil.  All the patient's questions and concerns were addressed.
Fluconazole Counseling:  Patient counseled regarding adverse effects of fluconazole including but not limited to headache, diarrhea, nausea, upset stomach, liver function test abnormalities, taste disturbance, and stomach pain.  There is a rare possibility of liver failure that can occur when taking fluconazole.  The patient understands that monitoring of LFTs and kidney function test may be required, especially at baseline. The patient verbalized understanding of the proper use and possible adverse effects of fluconazole.  All of the patient's questions and concerns were addressed.
Cyclosporine Counseling:  I discussed with the patient the risks of cyclosporine including but not limited to hypertension, gingival hyperplasia,myelosuppression, immunosuppression, liver damage, kidney damage, neurotoxicity, lymphoma, and serious infections. The patient understands that monitoring is required including baseline blood pressure, CBC, CMP, lipid panel and uric acid, and then 1-2 times monthly CMP and blood pressure.
Albendazole Pregnancy And Lactation Text: This medication is Pregnancy Category C and it isn't known if it is safe during pregnancy. It is also excreted in breast milk.
Infliximab Pregnancy And Lactation Text: This medication is Pregnancy Category B and is considered safe during pregnancy. It is unknown if this medication is excreted in breast milk.
Cephalexin Counseling: I counseled the patient regarding use of cephalexin as an antibiotic for prophylactic and/or therapeutic purposes. Cephalexin (commonly prescribed under brand name Keflex) is a cephalosporin antibiotic which is active against numerous classes of bacteria, including most skin bacteria. Side effects may include nausea, diarrhea, gastrointestinal upset, rash, hives, yeast infections, and in rare cases, hepatitis, kidney disease, seizures, fever, confusion, neurologic symptoms, and others. Patients with severe allergies to penicillin medications are cautioned that there is about a 10% incidence of cross-reactivity with cephalosporins. When possible, patients with penicillin allergies should use alternatives to cephalosporins for antibiotic therapy.
Thalidomide Pregnancy And Lactation Text: This medication is Pregnancy Category X and is absolutely contraindicated during pregnancy. It is unknown if it is excreted in breast milk.
Rituxan Pregnancy And Lactation Text: This medication is Pregnancy Category C and it isn't know if it is safe during pregnancy. It is unknown if this medication is excreted in breast milk but similar antibodies are known to be excreted.
Clindamycin Counseling: I counseled the patient regarding use of clindamycin as an antibiotic for prophylactic and/or therapeutic purposes. Clindamycin is active against numerous classes of bacteria, including skin bacteria. Side effects may include nausea, diarrhea, gastrointestinal upset, rash, hives, yeast infections, and in rare cases, colitis.
Opioid Counseling: I discussed with the patient the potential side effects of opioids including but not limited to addiction, altered mental status, and depression. I stressed avoiding alcohol, benzodiazepines, muscle relaxants and sleep aids unless specifically okayed by a physician. The patient verbalized understanding of the proper use and possible adverse effects of opioids. All of the patient's questions and concerns were addressed. They were instructed to flush the remaining pills down the toilet if they did not need them for pain.
Protopic Counseling: Patient may experience a mild burning sensation during topical application. Protopic is not approved in children less than 2 years of age. There have been case reports of hematologic and skin malignancies in patients using topical calcineurin inhibitors although causality is questionable.
Griseofulvin Counseling:  I discussed with the patient the risks of griseofulvin including but not limited to photosensitivity, cytopenia, liver damage, nausea/vomiting and severe allergy.  The patient understands that this medication is best absorbed when taken with a fatty meal (e.g., ice cream or french fries).
Valtrex Pregnancy And Lactation Text: this medication is Pregnancy Category B and is considered safe during pregnancy. This medication is not directly found in breast milk but it's metabolite acyclovir is present.
Niacinamide Counseling: I recommended taking niacin or niacinamide, also know as vitamin B3, twice daily. Recent evidence suggests that taking vitamin B3 (500 mg twice daily) can reduce the risk of actinic keratoses and non-melanoma skin cancers. Side effects of vitamin B3 include flushing and headache.
Valtrex Counseling: I discussed with the patient the risks of valacyclovir including but not limited to kidney damage, nausea, vomiting and severe allergy.  The patient understands that if the infection seems to be worsening or is not improving, they are to call.
Cephalexin Pregnancy And Lactation Text: This medication is Pregnancy Category B and considered safe during pregnancy.  It is also excreted in breast milk but can be used safely for shorter doses.
Rituxan Counseling:  I discussed with the patient the risks of Rituxan infusions. Side effects can include infusion reactions, severe drug rashes including mucocutaneous reactions, reactivation of latent hepatitis and other infections and rarely progressive multifocal leukoencephalopathy.  All of the patient's questions and concerns were addressed.
Fluconazole Pregnancy And Lactation Text: This medication is Pregnancy Category C and it isn't know if it is safe during pregnancy. It is also excreted in breast milk.
Cyclosporine Pregnancy And Lactation Text: This medication is Pregnancy Category C and it isn't know if it is safe during pregnancy. This medication is excreted in breast milk.
Use Enhanced Medication Counseling?: No
Griseofulvin Pregnancy And Lactation Text: This medication is Pregnancy Category X and is known to cause serious birth defects. It is unknown if this medication is excreted in breast milk but breast feeding should be avoided.
Niacinamide Pregnancy And Lactation Text: These medications are considered safe during pregnancy.
Methotrexate Pregnancy And Lactation Text: This medication is Pregnancy Category X and is known to cause fetal harm. This medication is excreted in breast milk.
Clindamycin Pregnancy And Lactation Text: This medication can be used in pregnancy if certain situations. Clindamycin is also present in breast milk.
Siliq Counseling:  I discussed with the patient the risks of Siliq including but not limited to new or worsening depression, suicidal thoughts and behavior, immunosuppression, malignancy, posterior leukoencephalopathy syndrome, and serious infections.  The patient understands that monitoring is required including a PPD at baseline and must alert us or the primary physician if symptoms of infection or other concerning signs are noted. There is also a special program designed to monitor depression which is required with Siliq.
5-Fu Counseling: 5-Fluorouracil Counseling:  I discussed with the patient the risks of 5-fluorouracil including but not limited to erythema, scaling, itching, weeping, crusting, and pain.
Methotrexate Counseling:  Patient counseled regarding adverse effects of methotrexate including but not limited to nausea, vomiting, abnormalities in liver function tests. Patients may develop mouth sores, rash, diarrhea, and abnormalities in blood counts. The patient understands that monitoring is required including LFT's and blood counts.  There is a rare possibility of scarring of the liver and lung problems that can occur when taking methotrexate. Persistent nausea, loss of appetite, pale stools, dark urine, cough, and shortness of breath should be reported immediately. Patient advised to discontinue methotrexate treatment at least three months before attempting to become pregnant.  I discussed the need for folate supplements while taking methotrexate.  These supplements can decrease side effects during methotrexate treatment. The patient verbalized understanding of the proper use and possible adverse effects of methotrexate.  All of the patient's questions and concerns were addressed.
Opioid Pregnancy And Lactation Text: These medications can lead to premature delivery and should be avoided during pregnancy. These medications are also present in breast milk in small amounts.
Protopic Pregnancy And Lactation Text: This medication is Pregnancy Category C. It is unknown if this medication is excreted in breast milk when applied topically.
Doxycycline Counseling:  Patient counseled regarding possible photosensitivity and increased risk for sunburn.  Patient instructed to avoid sunlight, if possible.  When exposed to sunlight, patients should wear protective clothing, sunglasses, and sunscreen.  The patient was instructed to call the office immediately if the following severe adverse effects occur:  hearing changes, easy bruising/bleeding, severe headache, or vision changes.  The patient verbalized understanding of the proper use and possible adverse effects of doxycycline.  All of the patient's questions and concerns were addressed.
Drysol Counseling:  I discussed with the patient the risks of drysol/aluminum chloride including but not limited to skin rash, itching, irritation, burning.
Prednisone Counseling:  I discussed with the patient the risks of prolonged use of prednisone including but not limited to weight gain, insomnia, osteoporosis, mood changes, diabetes, susceptibility to infection, glaucoma and high blood pressure.  In cases where prednisone use is prolonged, patients should be monitored with blood pressure checks, serum glucose levels and an eye exam.  Additionally, the patient may need to be placed on GI prophylaxis, PCP prophylaxis, and calcium and vitamin D supplementation and/or a bisphosphonate.  The patient verbalized understanding of the proper use and the possible adverse effects of prednisone.  All of the patient's questions and concerns were addressed.
Itraconazole Counseling:  I discussed with the patient the risks of itraconazole including but not limited to liver damage, nausea/vomiting, neuropathy, and severe allergy.  The patient understands that this medication is best absorbed when taken with acidic beverages such as non-diet cola or ginger ale.  The patient understands that monitoring is required including baseline LFTs and repeat LFTs at intervals.  The patient understands that they are to contact us or the primary physician if concerning signs are noted.
Rhofade Counseling: Rhofade is a topical medication which can decrease superficial blood flow where applied. Side effects are uncommon and include stinging, redness and allergic reactions.
Nsaids Counseling: NSAID Counseling: I discussed with the patient that NSAIDs should be taken with food. Prolonged use of NSAIDs can result in the development of stomach ulcers.  Patient advised to stop taking NSAIDs if abdominal pain occurs.  The patient verbalized understanding of the proper use and possible adverse effects of NSAIDs.  All of the patient's questions and concerns were addressed.
Drysol Pregnancy And Lactation Text: This medication is considered safe during pregnancy and breast feeding.
Solaraze Counseling:  I discussed with the patient the risks of Solaraze including but not limited to erythema, scaling, itching, weeping, crusting, and pain.
Simponi Counseling:  I discussed with the patient the risks of golimumab including but not limited to myelosuppression, immunosuppression, autoimmune hepatitis, demyelinating diseases, lymphoma, and serious infections.  The patient understands that monitoring is required including a PPD at baseline and must alert us or the primary physician if symptoms of infection or other concerning signs are noted.
Arava Counseling:  Patient counseled regarding adverse effects of Arava including but not limited to nausea, vomiting, abnormalities in liver function tests. Patients may develop mouth sores, rash, diarrhea, and abnormalities in blood counts. The patient understands that monitoring is required including LFTs and blood counts.  There is a rare possibility of scarring of the liver and lung problems that can occur when taking methotrexate. Persistent nausea, loss of appetite, pale stools, dark urine, cough, and shortness of breath should be reported immediately. Patient advised to discontinue Arava treatment and consult with a physician prior to attempting conception. The patient will have to undergo a treatment to eliminate Arava from the body prior to conception.
Doxycycline Pregnancy And Lactation Text: This medication is Pregnancy Category D and not consider safe during pregnancy. It is also excreted in breast milk but is considered safe for shorter treatment courses.
Taltz Counseling: I discussed with the patient the risks of ixekizumab including but not limited to immunosuppression, serious infections, worsening of inflammatory bowel disease and drug reactions.  The patient understands that monitoring is required including a PPD at baseline and must alert us or the primary physician if symptoms of infection or other concerning signs are noted.
Nsaids Pregnancy And Lactation Text: These medications are considered safe up to 30 weeks gestation. It is excreted in breast milk.
Solaraze Pregnancy And Lactation Text: This medication is Pregnancy Category B and is considered safe. There is some data to suggest avoiding during the third trimester. It is unknown if this medication is excreted in breast milk.
Tremfya Counseling: I discussed with the patient the risks of guselkumab including but not limited to immunosuppression, serious infections, worsening of inflammatory bowel disease and drug reactions.  The patient understands that monitoring is required including a PPD at baseline and must alert us or the primary physician if symptoms of infection or other concerning signs are noted.
Elidel Counseling: Patient may experience a mild burning sensation during topical application. Elidel is not approved in children less than 2 years of age. There have been case reports of hematologic and skin malignancies in patients using topical calcineurin inhibitors although causality is questionable.
Clofazimine Counseling:  I discussed with the patient the risks of clofazimine including but not limited to skin and eye pigmentation, liver damage, nausea/vomiting, gastrointestinal bleeding and allergy.
Ketoconazole Counseling:   Patient counseled regarding improving absorption with orange juice.  Adverse effects include but are not limited to breast enlargement, headache, diarrhea, nausea, upset stomach, liver function test abnormalities, taste disturbance, and stomach pain.  There is a rare possibility of liver failure that can occur when taking ketoconazole. The patient understands that monitoring of LFTs may be required, especially at baseline. The patient verbalized understanding of the proper use and possible adverse effects of ketoconazole.  All of the patient's questions and concerns were addressed.
Cimzia Counseling:  I discussed with the patient the risks of Cimzia including but not limited to immunosuppression, allergic reactions and infections.  The patient understands that monitoring is required including a PPD at baseline and must alert us or the primary physician if symptoms of infection or other concerning signs are noted.
Odomzo Counseling- I discussed with the patient the risks of Odomzo including but not limited to nausea, vomiting, diarrhea, constipation, weight loss, changes in the sense of taste, decreased appetite, muscle spasms, and hair loss.  The patient verbalized understanding of the proper use and possible adverse effects of Odomzo.  All of the patient's questions and concerns were addressed.
Erythromycin Counseling:  I discussed with the patient the risks of erythromycin including but not limited to GI upset, allergic reaction, drug rash, diarrhea, increase in liver enzymes, and yeast infections.
Acitretin Counseling:  I discussed with the patient the risks of acitretin including but not limited to hair loss, dry lips/skin/eyes, liver damage, hyperlipidemia, depression/suicidal ideation, photosensitivity.  Serious rare side effects can include but are not limited to pancreatitis, pseudotumor cerebri, bony changes, clot formation/stroke/heart attack.  Patient understands that alcohol is contraindicated since it can result in liver toxicity and significantly prolong the elimination of the drug by many years.
Clofazimine Pregnancy And Lactation Text: This medication is Pregnancy Category C and isn't considered safe during pregnancy. It is excreted in breast milk.
Metronidazole Counseling:  I discussed with the patient the risks of metronidazole including but not limited to seizures, nausea/vomiting, a metallic taste in the mouth, nausea/vomiting and severe allergy.
Topical Retinoid counseling:  Patient advised to apply a pea-sized amount only at bedtime and wait 30 minutes after washing their face before applying.  If too drying, patient may add a non-comedogenic moisturizer. The patient verbalized understanding of the proper use and possible adverse effects of retinoids.  All of the patient's questions and concerns were addressed.
Terbinafine Counseling: Patient counseling regarding adverse effects of terbinafine including but not limited to headache, diarrhea, rash, upset stomach, liver function test abnormalities, itching, taste/smell disturbance, nausea, abdominal pain, and flatulence.  There is a rare possibility of liver failure that can occur when taking terbinafine.  The patient understands that a baseline LFT and kidney function test may be required. The patient verbalized understanding of the proper use and possible adverse effects of terbinafine.  All of the patient's questions and concerns were addressed.
Otezla Counseling: The side effects of Otezla were discussed with the patient, including but not limited to worsening or new depression, weight loss, diarrhea, nausea, upper respiratory tract infection, and headache. Patient instructed to call the office should any adverse effect occur.  The patient verbalized understanding of the proper use and possible adverse effects of Otezla.  All the patient's questions and concerns were addressed.
Cimzia Pregnancy And Lactation Text: This medication crosses the placenta but can be considered safe in certain situations. Cimzia may be excreted in breast milk.
Stelara Counseling:  I discussed with the patient the risks of ustekinumab including but not limited to immunosuppression, malignancy, posterior leukoencephalopathy syndrome, and serious infections.  The patient understands that monitoring is required including a PPD at baseline and must alert us or the primary physician if symptoms of infection or other concerning signs are noted.
Erythromycin Pregnancy And Lactation Text: This medication is Pregnancy Category B and is considered safe during pregnancy. It is also excreted in breast milk.
Ketoconazole Pregnancy And Lactation Text: This medication is Pregnancy Category C and it isn't know if it is safe during pregnancy. It is also excreted in breast milk and breast feeding isn't recommended.
Terbinafine Pregnancy And Lactation Text: This medication is Pregnancy Category B and is considered safe during pregnancy. It is also excreted in breast milk and breast feeding isn't recommended.
Otezla Pregnancy And Lactation Text: This medication is Pregnancy Category C and it isn't known if it is safe during pregnancy. It is unknown if it is excreted in breast milk.
Eucrisa Counseling: Patient may experience a mild burning sensation during topical application. Eucrisa is not approved in children less than 2 years of age.
Detail Level: Zone
Metronidazole Pregnancy And Lactation Text: This medication is Pregnancy Category B and considered safe during pregnancy.  It is also excreted in breast milk.
Colchicine Counseling:  Patient counseled regarding adverse effects including but not limited to stomach upset (nausea, vomiting, stomach pain, or diarrhea).  Patient instructed to limit alcohol consumption while taking this medication.  Colchicine may reduce blood counts especially with prolonged use.  The patient understands that monitoring of kidney function and blood counts may be required, especially at baseline. The patient verbalized understanding of the proper use and possible adverse effects of colchicine.  All of the patient's questions and concerns were addressed.
Cosentyx Counseling:  I discussed with the patient the risks of Cosentyx including but not limited to worsening of Crohn's disease, immunosuppression, allergic reactions and infections.  The patient understands that monitoring is required including a PPD at baseline and must alert us or the primary physician if symptoms of infection or other concerning signs are noted.
Acitretin Pregnancy And Lactation Text: This medication is Pregnancy Category X and should not be given to women who are pregnant or may become pregnant in the future. This medication is excreted in breast milk.
Xeljanz Counseling: I discussed with the patient the risks of Xeljanz therapy including increased risk of infection, liver issues, headache, diarrhea, or cold symptoms. Live vaccines should be avoided. They were instructed to call if they have any problems.
Tazorac Counseling:  Patient advised that medication is irritating and drying.  Patient may need to apply sparingly and wash off after an hour before eventually leaving it on overnight.  The patient verbalized understanding of the proper use and possible adverse effects of tazorac.  All of the patient's questions and concerns were addressed.
Dupixent Counseling: I discussed with the patient the risks of dupilumab including but not limited to eye infection and irritation, cold sores, injection site reactions, worsening of asthma, allergic reactions and increased risk of parasitic infection.  Live vaccines should be avoided while taking dupilumab. Dupilumab will also interact with certain medications such as warfarin and cyclosporine. The patient understands that monitoring is required and they must alert us or the primary physician if symptoms of infection or other concerning signs are noted.
Xelgelacioz Pregnancy And Lactation Text: This medication is Pregnancy Category D and is not considered safe during pregnancy.  The risk during breast feeding is also uncertain.
Minocycline Counseling: Patient advised regarding possible photosensitivity and discoloration of the teeth, skin, lips, tongue and gums.  Patient instructed to avoid sunlight, if possible.  When exposed to sunlight, patients should wear protective clothing, sunglasses, and sunscreen.  The patient was instructed to call the office immediately if the following severe adverse effects occur:  hearing changes, easy bruising/bleeding, severe headache, or vision changes.  The patient verbalized understanding of the proper use and possible adverse effects of minocycline.  All of the patient's questions and concerns were addressed.
Eucrisa Pregnancy And Lactation Text: This medication has not been assigned a Pregnancy Risk Category but animal studies failed to show danger with the topical medication. It is unknown if the medication is excreted in breast milk.
Oxybutynin Counseling:  I discussed with the patient the risks of oxybutynin including but not limited to skin rash, drowsiness, dry mouth, difficulty urinating, and blurred vision.
Bexarotene Counseling:  I discussed with the patient the risks of bexarotene including but not limited to hair loss, dry lips/skin/eyes, liver abnormalities, hyperlipidemia, pancreatitis, depression/suicidal ideation, photosensitivity, drug rash/allergic reactions, hypothyroidism, anemia, leukopenia, infection, cataracts, and teratogenicity.  Patient understands that they will need regular blood tests to check lipid profile, liver function tests, white blood cell count, thyroid function tests and pregnancy test if applicable.
Dapsone Pregnancy And Lactation Text: This medication is Pregnancy Category C and is not considered safe during pregnancy or breast feeding.
Isotretinoin Counseling: Patient should get monthly blood tests, not donate blood, not drive at night if vision affected, not share medication, and not undergo elective surgery for 6 months after tx completed. Side effects reviewed, pt to contact office should one occur.
Dupixent Pregnancy And Lactation Text: This medication likely crosses the placenta but the risk for the fetus is uncertain. This medication is excreted in breast milk.
Xolair Pregnancy And Lactation Text: This medication is Pregnancy Category B and is considered safe during pregnancy. This medication is excreted in breast milk.
Bexarotene Pregnancy And Lactation Text: This medication is Pregnancy Category X and should not be given to women who are pregnant or may become pregnant. This medication should not be used if you are breast feeding.
Oxybutynin Pregnancy And Lactation Text: This medication is Pregnancy Category B and is considered safe during pregnancy. It is unknown if it is excreted in breast milk.
Xolair Counseling:  Patient informed of potential adverse effects including but not limited to fever, muscle aches, rash and allergic reactions.  The patient verbalized understanding of the proper use and possible adverse effects of Xolair.  All of the patient's questions and concerns were addressed.
Dapsone Counseling: I discussed with the patient the risks of dapsone including but not limited to hemolytic anemia, agranulocytosis, rashes, methemoglobinemia, kidney failure, peripheral neuropathy, headaches, GI upset, and liver toxicity.  Patients who start dapsone require monitoring including baseline LFTs and weekly CBCs for the first month, then every month thereafter.  The patient verbalized understanding of the proper use and possible adverse effects of dapsone.  All of the patient's questions and concerns were addressed.
Cimetidine Counseling:  I discussed with the patient the risks of Cimetidine including but not limited to gynecomastia, headache, diarrhea, nausea, drowsiness, arrhythmias, pancreatitis, skin rashes, psychosis, bone marrow suppression and kidney toxicity.
Tazorac Pregnancy And Lactation Text: This medication is not safe during pregnancy. It is unknown if this medication is excreted in breast milk.
Hydroquinone Counseling:  Patient advised that medication may result in skin irritation, lightening (hypopigmentation), dryness, and burning.  In the event of skin irritation, the patient was advised to reduce the amount of the drug applied or use it less frequently.  Rarely, spots that are treated with hydroquinone can become darker (pseudoochronosis).  Should this occur, patient instructed to stop medication and call the office. The patient verbalized understanding of the proper use and possible adverse effects of hydroquinone.  All of the patient's questions and concerns were addressed.
Imiquimod Counseling:  I discussed with the patient the risks of imiquimod including but not limited to erythema, scaling, itching, weeping, crusting, and pain.  Patient understands that the inflammatory response to imiquimod is variable from person to person and was educated regarded proper titration schedule.  If flu-like symptoms develop, patient knows to discontinue the medication and contact us.
Birth Control Pills Pregnancy And Lactation Text: This medication should be avoided if pregnant and for the first 30 days post-partum.
Isotretinoin Pregnancy And Lactation Text: This medication is Pregnancy Category X and is considered extremely dangerous during pregnancy. It is unknown if it is excreted in breast milk.
Topical Clindamycin Counseling: Patient counseled that this medication may cause skin irritation or allergic reactions.  In the event of skin irritation, the patient was advised to reduce the amount of the drug applied or use it less frequently.   The patient verbalized understanding of the proper use and possible adverse effects of clindamycin.  All of the patient's questions and concerns were addressed.
Birth Control Pills Counseling: Birth Control Pill Counseling: I discussed with the patient the potential side effects of OCPs including but not limited to increased risk of stroke, heart attack, thrombophlebitis, deep venous thrombosis, hepatic adenomas, breast changes, GI upset, headaches, and depression.  The patient verbalized understanding of the proper use and possible adverse effects of OCPs. All of the patient's questions and concerns were addressed.
Enbrel Counseling:  I discussed with the patient the risks of etanercept including but not limited to myelosuppression, immunosuppression, autoimmune hepatitis, demyelinating diseases, lymphoma, and infections.  The patient understands that monitoring is required including a PPD at baseline and must alert us or the primary physician if symptoms of infection or other concerning signs are noted.
Quinolones Counseling:  I discussed with the patient the risks of fluoroquinolones including but not limited to GI upset, allergic reaction, drug rash, diarrhea, dizziness, photosensitivity, yeast infections, liver function test abnormalities, tendonitis/tendon rupture.
Rifampin Counseling: I discussed with the patient the risks of rifampin including but not limited to liver damage, kidney damage, red-orange body fluids, nausea/vomiting and severe allergy.
Spironolactone Counseling: Patient advised regarding risks of diarrhea, abdominal pain, hyperkalemia, birth defects (for female patients), liver toxicity and renal toxicity. The patient may need blood work to monitor liver and kidney function and potassium levels while on therapy. The patient verbalized understanding of the proper use and possible adverse effects of spironolactone.  All of the patient's questions and concerns were addressed.
Doxepin Pregnancy And Lactation Text: This medication is Pregnancy Category C and it isn't known if it is safe during pregnancy. It is also excreted in breast milk and breast feeding isn't recommended.
High Dose Vitamin A Counseling: Side effects reviewed, pt to contact office should one occur.
Topical Sulfur Applications Counseling: Topical Sulfur Counseling: Patient counseled that this medication may cause skin irritation or allergic reactions.  In the event of skin irritation, the patient was advised to reduce the amount of the drug applied or use it less frequently.   The patient verbalized understanding of the proper use and possible adverse effects of topical sulfur application.  All of the patient's questions and concerns were addressed.
Erivedge Counseling- I discussed with the patient the risks of Erivedge including but not limited to nausea, vomiting, diarrhea, constipation, weight loss, changes in the sense of taste, decreased appetite, muscle spasms, and hair loss.  The patient verbalized understanding of the proper use and possible adverse effects of Erivedge.  All of the patient's questions and concerns were addressed.
Doxepin Counseling:  Patient advised that the medication is sedating and not to drive a car after taking this medication. Patient informed of potential adverse effects including but not limited to dry mouth, urinary retention, and blurry vision.  The patient verbalized understanding of the proper use and possible adverse effects of doxepin.  All of the patient's questions and concerns were addressed.
Azathioprine Counseling:  I discussed with the patient the risks of azathioprine including but not limited to myelosuppression, immunosuppression, hepatotoxicity, lymphoma, and infections.  The patient understands that monitoring is required including baseline LFTs, Creatinine, possible TPMP genotyping and weekly CBCs for the first month and then every 2 weeks thereafter.  The patient verbalized understanding of the proper use and possible adverse effects of azathioprine.  All of the patient's questions and concerns were addressed.
Hydroxyzine Counseling: Patient advised that the medication is sedating and not to drive a car after taking this medication.  Patient informed of potential adverse effects including but not limited to dry mouth, urinary retention, and blurry vision.  The patient verbalized understanding of the proper use and possible adverse effects of hydroxyzine.  All of the patient's questions and concerns were addressed.
Topical Sulfur Applications Pregnancy And Lactation Text: This medication is Pregnancy Category C and has an unknown safety profile during pregnancy. It is unknown if this topical medication is excreted in breast milk.
Minoxidil Counseling: Minoxidil is a topical medication which can increase blood flow where it is applied. It is uncertain how this medication increases hair growth. Side effects are uncommon and include stinging and allergic reactions.
Spironolactone Pregnancy And Lactation Text: This medication can cause feminization of the male fetus and should be avoided during pregnancy. The active metabolite is also found in breast milk.
Azithromycin Counseling:  I discussed with the patient the risks of azithromycin including but not limited to GI upset, allergic reaction, drug rash, diarrhea, and yeast infections.
Gabapentin Counseling: I discussed with the patient the risks of gabapentin including but not limited to dizziness, somnolence, fatigue and ataxia.
Rifampin Pregnancy And Lactation Text: This medication is Pregnancy Category C and it isn't know if it is safe during pregnancy. It is also excreted in breast milk and should not be used if you are breast feeding.
High Dose Vitamin A Pregnancy And Lactation Text: High dose vitamin A therapy is contraindicated during pregnancy and breast feeding.
Humira Counseling:  I discussed with the patient the risks of adalimumab including but not limited to myelosuppression, immunosuppression, autoimmune hepatitis, demyelinating diseases, lymphoma, and serious infections.  The patient understands that monitoring is required including a PPD at baseline and must alert us or the primary physician if symptoms of infection or other concerning signs are noted.
Azathioprine Pregnancy And Lactation Text: This medication is Pregnancy Category D and isn't considered safe during pregnancy. It is unknown if this medication is excreted in breast milk.
Wartpeel Counseling:  I discussed with the patient the risks of Wartpeel including but not limited to erythema, scaling, itching, weeping, crusting, and pain.
Hydroxyzine Pregnancy And Lactation Text: This medication is not safe during pregnancy and should not be taken. It is also excreted in breast milk and breast feeding isn't recommended.
Ilumya Counseling: I discussed with the patient the risks of tildrakizumab including but not limited to immunosuppression, malignancy, posterior leukoencephalopathy syndrome, and serious infections.  The patient understands that monitoring is required including a PPD at baseline and must alert us or the primary physician if symptoms of infection or other concerning signs are noted.
SSKI Counseling:  I discussed with the patient the risks of SSKI including but not limited to thyroid abnormalities, metallic taste, GI upset, fever, headache, acne, arthralgias, paraesthesias, lymphadenopathy, easy bleeding, arrhythmias, and allergic reaction.
Azithromycin Pregnancy And Lactation Text: This medication is considered safe during pregnancy and is also secreted in breast milk.
Cellcept Counseling:  I discussed with the patient the risks of mycophenolate mofetil including but not limited to infection/immunosuppression, GI upset, hypokalemia, hypercholesterolemia, bone marrow suppression, lymphoproliferative disorders, malignancy, GI ulceration/bleed/perforation, colitis, interstitial lung disease, kidney failure, progressive multifocal leukoencephalopathy, and birth defects.  The patient understands that monitoring is required including a baseline creatinine and regular CBC testing. In addition, patient must alert us immediately if symptoms of infection or other concerning signs are noted.
Tetracycline Counseling: Patient counseled regarding possible photosensitivity and increased risk for sunburn.  Patient instructed to avoid sunlight, if possible.  When exposed to sunlight, patients should wear protective clothing, sunglasses, and sunscreen.  The patient was instructed to call the office immediately if the following severe adverse effects occur:  hearing changes, easy bruising/bleeding, severe headache, or vision changes.  The patient verbalized understanding of the proper use and possible adverse effects of tetracycline.  All of the patient's questions and concerns were addressed. Patient understands to avoid pregnancy while on therapy due to potential birth defects.

## 2019-06-11 NOTE — HPI: EVALUATION OF SKIN LESION(S)
What Type Of Note Output Would You Prefer (Optional)?: Standard Output
How Severe Are Your Spot(S)?: mild
Have Your Spot(S) Been Treated In The Past?: has not been treated
Hpi Title: Evaluation of Skin Lesions
Additional History: Patient states that she was given Clindamycin previously that worked well. She states that she was given Clobetasol previously as well and this worked also. She states that she was on a regimen of Clindamycin for 7 days then would switch to the Clobetasol for 7 days.  Patient is in for a FBE.

## 2019-06-11 NOTE — PROCEDURE: DIAGNOSIS COMMENT
Comment: Will attempt mupirocin versus Clindamycin foam. If patient not happy with mupirocin will write new rx for Clindamycin foam.
Detail Level: Zone
Comment: Patient has had this condition for many years. She has had success with alternating clindamycin and topical steroids, however she would like to try something in lieu of the clindamycin. rx for mupirocin given for patient try. She has also had success with therapy as well. Very few excoriations present on today’s exam, diffuse scarring present.

## 2019-06-12 ENCOUNTER — OFFICE VISIT (OUTPATIENT)
Dept: MEDICAL GROUP | Facility: LAB | Age: 59
End: 2019-06-12
Payer: COMMERCIAL

## 2019-06-12 VITALS
OXYGEN SATURATION: 95 % | TEMPERATURE: 97.2 F | HEART RATE: 52 BPM | DIASTOLIC BLOOD PRESSURE: 60 MMHG | HEIGHT: 71 IN | WEIGHT: 143 LBS | RESPIRATION RATE: 12 BRPM | BODY MASS INDEX: 20.02 KG/M2 | SYSTOLIC BLOOD PRESSURE: 100 MMHG

## 2019-06-12 DIAGNOSIS — D69.6 THROMBOCYTOPENIA (HCC): ICD-10-CM

## 2019-06-12 DIAGNOSIS — Z00.00 ROUTINE GENERAL MEDICAL EXAMINATION AT A HEALTH CARE FACILITY: ICD-10-CM

## 2019-06-12 DIAGNOSIS — Z13.820 SCREENING FOR OSTEOPOROSIS: ICD-10-CM

## 2019-06-12 DIAGNOSIS — E03.9 HYPOTHYROIDISM, UNSPECIFIED TYPE: ICD-10-CM

## 2019-06-12 PROBLEM — R23.3 EASY BRUISING: Status: RESOLVED | Noted: 2018-08-17 | Resolved: 2019-06-12

## 2019-06-12 PROCEDURE — 99396 PREV VISIT EST AGE 40-64: CPT | Performed by: NURSE PRACTITIONER

## 2019-06-12 NOTE — ASSESSMENT & PLAN NOTE
Component      Latest Ref Rng & Units 6/7/2019 6/7/2019           6:30 AM  6:30 AM   TSH      0.380 - 5.330 uIU/mL 4.540    Free T-4      0.53 - 1.43 ng/dL  0.90

## 2019-06-12 NOTE — PROGRESS NOTES
HPI:  Lianne is a 58 y.o.  female who presents for annual exam. Generally the patient is feeling good. She has no complaints or concerns.  She is extremely physically active, running and swimming.  Sleeping well at night.  Denies any problems with bowels or bladder.  No longer having any difficulty with easy bruising or bleeding.  She is also followed by dermatology and gynecology.  Regarding her health maintenance:   Last pap: n/a - s/p hysterectomy  Last Mammo:5/2019  Last colonoscopy:2015  Bone density test:N\A   Last Lab: current  Last Td:current   Influenza vaccination:current   Pneumococcal vaccination:not applicable   shingrix:  Hx STD''s: no   Regular exercise: yes  Off HRT since 2017.      Hypothyroidism  Component      Latest Ref Rng & Units 6/7/2019 6/7/2019           6:30 AM  6:30 AM   TSH      0.380 - 5.330 uIU/mL 4.540    Free T-4      0.53 - 1.43 ng/dL  0.90       Thrombocytopenia (HCC)  Platelets back to normal this year.  Saw hematology last year - liver US normal.  Sister had ITP.  Not bruising / bleeding easily.    meds:   Current Outpatient Prescriptions   Medication Sig Dispense Refill   • levothyroxine (SYNTHROID) 100 MCG Tab Take 1 Tab by mouth every day. In morning one hour prior to any food or drink. 90 Tab 3   • metaxalone (SKELAXIN) 800 MG Tab Take 1 Tab by mouth 3 times a day. 60 Tab 0   • clobetasol (TEMOVATE) 0.05 % Cream Apply 1 g to affected area(s) 2 times a day. Apply to affected skin BID for up to 2 weeks. DO NOT use on FACE 45 g 0   • ibuprofen (MOTRIN) 600 MG Tab Take 1 Tab by mouth every 6 hours as needed. 90 Tab 1   • Cholecalciferol (VITAMIN D) 2000 UNITS CAPS Take  by mouth every day.     • CALCIUM-MAGNESIUM-ZINC PO Take  by mouth every day.     • Clindamycin Phosphate 1 % Foam 1 g by Apply externally route 2 times a day as needed. 50 g 2     No current facility-administered medications for this visit.        Allergies: No Known Allergies    family:   Family History  "  Problem Relation Age of Onset   • Alzheimer's Disease Mother    • Thyroid Father    • Heart Disease Father 40        MI   • Colon Cancer Father    • Cancer Sister         ovarian ca   • Thyroid Sister    • Cancer Sister         SCCA   • Thyroid Sister    • Stroke Neg Hx    • Diabetes Neg Hx        social hx:   Social History     Social History   • Marital status:      Spouse name: N/A   • Number of children: N/A   • Years of education: N/A     Occupational History   • Teacher [employer]     Social History Main Topics   • Smoking status: Never Smoker   • Smokeless tobacco: Never Used   • Alcohol use No   • Drug use: No   • Sexual activity: Not Currently     Partners: Male     Other Topics Concern   • Not on file     Social History Narrative   • No narrative on file       ROS:  No fever, chills, sweats.   No polydipsia, polyuria, temperature intolerance, significant weight changes   No visual changes, blurred vision.  No chest pain, palpitations, peripheral swelling   No chronic cough, shortness of breath, dyspnea with exertion.   No dysphagia, odynophagia, black or bloody stools.   No abdominal pain, nausea, persistent diarrhea, constipation   No dysuria, hematuria, incontinence. Denies nocturia  No rash, pruritis, pigment changes.   No focal weakness, syncope, headache, confusion, persistent numbness.   All other systems are reviewed and negative.    PHYSICAL EXAMINATION:  /60 (BP Location: Left arm, Patient Position: Sitting, BP Cuff Size: Adult)   Pulse (!) 52   Temp 36.2 °C (97.2 °F)   Resp 12   Ht 1.803 m (5' 11\")   Wt 64.9 kg (143 lb)   SpO2 95%   General appearance:healthy, well developed, well nourished  Psych: alert, no distress, cooperative  Eyes: EOM's normal, pupils equal, round, reactive to light  ENT: Ears: external ears normal to inspection and palpation, TM's clear, Nose/Sinuses: nose shows no deformity, asymmetry, or inflammation  Neck: no asymmetry, masses, or " scars, no adenopathy, thyroid normal to palpation  Lungs:chest symmetric with normal A/P diameter, no chest deformities noted, normal respiratory rate and rhythm  Cardiovascular:regular rate and rhythm, S1 normal  Abdomen: umbilicus normal, no masses palpable, no organomegaly  Musculoskeletal:ROM of all joints is normal, no evidence of joint instability  Lymphatic: None significantly enlarged  Skin: no rash, no edema  Neuro: mental status intact, cranial nerves 2-12 intact    ASSESSMENT/PLAN:  1.annual physical exam: HCM:  Encourage monthly self breast exam.  Gynecological care is up-to-date with Dr. Rodgers.  Colonoscopy is up-to-date.  Reviewed her mammogram and lab work with her.  Recommend a bone density.  Encourage daily exercise for at least 30 minutes  Recommend 1500 mg Calcium with 600 units vit d daily.    Continue thyroid replacement at current dosage.  Follow-up yearly.

## 2019-06-12 NOTE — ASSESSMENT & PLAN NOTE
Platelets back to normal this year.  Saw hematology last year - liver US normal.  Sister had ITP.  Not bruising / bleeding easily.

## 2019-07-09 ENCOUNTER — HOSPITAL ENCOUNTER (OUTPATIENT)
Dept: RADIOLOGY | Facility: MEDICAL CENTER | Age: 59
End: 2019-07-09
Attending: NURSE PRACTITIONER
Payer: COMMERCIAL

## 2019-07-09 DIAGNOSIS — Z13.820 SCREENING FOR OSTEOPOROSIS: ICD-10-CM

## 2019-07-09 PROCEDURE — 77080 DXA BONE DENSITY AXIAL: CPT

## 2019-07-13 DIAGNOSIS — L98.9 BENIGN SKIN LESION: ICD-10-CM

## 2019-07-13 NOTE — TELEPHONE ENCOUNTER
Was the patient seen in the last year in this department? Yes lov 6/12/19    Does patient have an active prescription for medications requested? No     Received Request Via: Pharmacy

## 2019-07-14 RX ORDER — CLINDAMYCIN PHOSPHATE 10 MG/G
AEROSOL, FOAM TOPICAL
Qty: 50 G | Refills: 2 | Status: SHIPPED | OUTPATIENT
Start: 2019-07-14

## 2019-07-23 DIAGNOSIS — E03.9 HYPOTHYROIDISM, UNSPECIFIED TYPE: ICD-10-CM

## 2019-07-23 RX ORDER — LEVOTHYROXINE SODIUM 0.1 MG/1
TABLET ORAL
Qty: 90 TAB | Refills: 1 | Status: SHIPPED | OUTPATIENT
Start: 2019-07-23 | End: 2020-04-06 | Stop reason: SDUPTHER

## 2019-07-23 NOTE — TELEPHONE ENCOUNTER
Was the patient seen in the last year in this department? Yes 6/12/19    Does patient have an active prescription for medications requested? No     Received Request Via: Pharmacy

## 2019-11-16 ENCOUNTER — PATIENT MESSAGE (OUTPATIENT)
Dept: MEDICAL GROUP | Facility: LAB | Age: 59
End: 2019-11-16

## 2020-04-06 DIAGNOSIS — E03.9 HYPOTHYROIDISM, UNSPECIFIED TYPE: ICD-10-CM

## 2020-04-08 RX ORDER — LEVOTHYROXINE SODIUM 0.1 MG/1
100 TABLET ORAL
Qty: 90 TAB | Refills: 0 | Status: SHIPPED | OUTPATIENT
Start: 2020-04-08

## 2021-07-02 ENCOUNTER — HOSPITAL ENCOUNTER (OUTPATIENT)
Dept: RADIOLOGY | Facility: MEDICAL CENTER | Age: 61
End: 2021-07-02
Payer: COMMERCIAL

## 2022-08-16 ENCOUNTER — APPOINTMENT (RX ONLY)
Dept: URBAN - METROPOLITAN AREA CLINIC 4 | Facility: CLINIC | Age: 62
Setting detail: DERMATOLOGY
End: 2022-08-16

## 2022-08-16 DIAGNOSIS — L72.0 EPIDERMAL CYST: ICD-10-CM

## 2022-08-16 DIAGNOSIS — L82.1 OTHER SEBORRHEIC KERATOSIS: ICD-10-CM

## 2022-08-16 DIAGNOSIS — Z71.89 OTHER SPECIFIED COUNSELING: ICD-10-CM

## 2022-08-16 DIAGNOSIS — D22 MELANOCYTIC NEVI: ICD-10-CM

## 2022-08-16 DIAGNOSIS — D18.0 HEMANGIOMA: ICD-10-CM

## 2022-08-16 DIAGNOSIS — L81.4 OTHER MELANIN HYPERPIGMENTATION: ICD-10-CM

## 2022-08-16 PROBLEM — D22.5 MELANOCYTIC NEVI OF TRUNK: Status: ACTIVE | Noted: 2022-08-16

## 2022-08-16 PROBLEM — D48.5 NEOPLASM OF UNCERTAIN BEHAVIOR OF SKIN: Status: ACTIVE | Noted: 2022-08-16

## 2022-08-16 PROBLEM — D18.01 HEMANGIOMA OF SKIN AND SUBCUTANEOUS TISSUE: Status: ACTIVE | Noted: 2022-08-16

## 2022-08-16 PROCEDURE — ? DEFER

## 2022-08-16 PROCEDURE — 99203 OFFICE O/P NEW LOW 30 MIN: CPT

## 2022-08-16 PROCEDURE — ? COUNSELING

## 2022-08-16 PROCEDURE — ? ADDITIONAL NOTES

## 2022-08-16 ASSESSMENT — LOCATION SIMPLE DESCRIPTION DERM
LOCATION SIMPLE: LOWER BACK
LOCATION SIMPLE: UPPER BACK
LOCATION SIMPLE: GROIN
LOCATION SIMPLE: ABDOMEN
LOCATION SIMPLE: RIGHT UPPER BACK
LOCATION SIMPLE: CHEST

## 2022-08-16 ASSESSMENT — LOCATION DETAILED DESCRIPTION DERM
LOCATION DETAILED: LEFT LATERAL SUPERIOR CHEST
LOCATION DETAILED: SUPERIOR THORACIC SPINE
LOCATION DETAILED: INFERIOR THORACIC SPINE
LOCATION DETAILED: RIGHT MEDIAL SUPERIOR CHEST
LOCATION DETAILED: PERIUMBILICAL SKIN
LOCATION DETAILED: RIGHT INFERIOR MEDIAL UPPER BACK
LOCATION DETAILED: EPIGASTRIC SKIN
LOCATION DETAILED: SUPERIOR LUMBAR SPINE
LOCATION DETAILED: RIGHT SUPERIOR UPPER BACK
LOCATION DETAILED: RIGHT INGUINAL CREASE

## 2022-08-16 ASSESSMENT — LOCATION ZONE DERM: LOCATION ZONE: TRUNK

## 2022-08-16 NOTE — PROCEDURE: DEFER
X Size Of Lesion In Cm (Optional): 0
Detail Level: Detailed
Introduction Text (Please End With A Colon): The following procedure was deferred: biopsy

## 2022-08-16 NOTE — PROCEDURE: ADDITIONAL NOTES
Render Risk Assessment In Note?: no
Detail Level: Detailed
Additional Notes: Recommended biopsy of lesion. However, patient does not currently have coverage and is moving to [place]. Will wait on biopsy for appointment patient has scheduled with derm in Feb in [place].

## 2024-08-14 NOTE — LETTER
Ambulatory Care Coordination Note     8/14/2024 10:17 AM     ACM outreach attempt by this ACM today to offer care management services. ACM was unable to reach the patient by telephone today;  Message states patient is not available.     ACM: Marga Pederson RN     Care Summary Note: Unable to reach patient at this time.          Follow Up:   Plan for next ACM outreach in approximately 1-2 days  to complete:  - outreach attempt to offer care management services.      Marga Pederson RN BSN  [phone]    [employer] [facility]  Keyana Khan M.D.  [address]  [place] [address]  Fax: [phone]   Authorization for Release/Disclosure of   Protected Health Information   Name: LIANNE LEE : 1960 SSN: [id number]   [address] [place] [address] Phone:    [phone] (home)    I authorize the entity listed below to release/disclose the PHI below to:   [facility]Keyana Khan M.D. and [facility]   Provider or Entity Name:  Db Rodgers M.D.   Address   [facility], [facility]   Phone:      Fax:  [phone]   Reason for request: continuity of care   Information to be released:    [  ] LAST COLONOSCOPY,  including any PATH REPORT and follow-up  [  ] LAST FIT/COLOGUARD RESULT [  ] LAST DEXA  [  ] LAST MAMMOGRAM  [XX  ] LAST PAP  [  ] LAST LABS [  ] RETINA EXAM REPORT  [  ] IMMUNIZATION RECORDS  [  ] Release all info      [  ] Check here and initial the line next to each item to release ALL health information INCLUDING  _____ Care and treatment for drug and / or alcohol abuse  _____ HIV testing, infection status, or AIDS  _____ Genetic Testing    DATES OF SERVICE OR TIME PERIOD TO BE DISCLOSED: _____________  I understand and acknowledge that:  * This Authorization may be revoked at any time by you in writing, except if your health information has already been used or disclosed.  * Your health information that will be used or disclosed as a result of you signing this authorization could be re-disclosed by the recipient. If this occurs, your re-disclosed health information may no longer be protected by State or Federal laws.  * You may refuse to sign this Authorization. Your refusal will not affect your ability to obtain treatment.  * This Authorization becomes effective upon signing and will  on (date) __________.      If no date is indicated, this Authorization will  one (1) year from the signature date.    Name: Lianne Figueroa  Brady    Signature:   Date:     6/12/2019       PLEASE FAX REQUESTED RECORDS BACK TO: [phone]